# Patient Record
Sex: MALE | Race: WHITE | NOT HISPANIC OR LATINO | Employment: OTHER | URBAN - METROPOLITAN AREA
[De-identification: names, ages, dates, MRNs, and addresses within clinical notes are randomized per-mention and may not be internally consistent; named-entity substitution may affect disease eponyms.]

---

## 2017-06-22 ENCOUNTER — OFFICE VISIT (OUTPATIENT)
Dept: NEUROLOGY | Facility: CLINIC | Age: 82
End: 2017-06-22

## 2017-06-22 VITALS
BODY MASS INDEX: 19.46 KG/M2 | SYSTOLIC BLOOD PRESSURE: 130 MMHG | HEIGHT: 64 IN | WEIGHT: 114 LBS | DIASTOLIC BLOOD PRESSURE: 72 MMHG | HEART RATE: 107 BPM | OXYGEN SATURATION: 98 %

## 2017-06-22 DIAGNOSIS — G30.1 LATE ONSET ALZHEIMER'S DISEASE WITHOUT BEHAVIORAL DISTURBANCE (HCC): Primary | ICD-10-CM

## 2017-06-22 DIAGNOSIS — R41.3 MEMORY LOSS: ICD-10-CM

## 2017-06-22 DIAGNOSIS — R63.4 WEIGHT LOSS: ICD-10-CM

## 2017-06-22 DIAGNOSIS — G95.9 MYELOPATHY (HCC): ICD-10-CM

## 2017-06-22 DIAGNOSIS — F02.80 LATE ONSET ALZHEIMER'S DISEASE WITHOUT BEHAVIORAL DISTURBANCE (HCC): Primary | ICD-10-CM

## 2017-06-22 PROBLEM — J44.9 CHRONIC OBSTRUCTIVE PULMONARY DISEASE (HCC): Status: ACTIVE | Noted: 2017-06-22

## 2017-06-22 PROBLEM — N40.0 BENIGN PROSTATIC HYPERPLASIA: Status: ACTIVE | Noted: 2017-06-22

## 2017-06-22 PROBLEM — I65.29 STENOSIS OF CAROTID ARTERY: Status: ACTIVE | Noted: 2017-06-22

## 2017-06-22 PROBLEM — E78.5 HYPERLIPIDEMIA: Status: ACTIVE | Noted: 2017-06-22

## 2017-06-22 PROBLEM — F03.90 DEMENTIA (HCC): Status: ACTIVE | Noted: 2017-01-25

## 2017-06-22 PROBLEM — I25.2 OLD MYOCARDIAL INFARCTION: Status: ACTIVE | Noted: 2017-06-19

## 2017-06-22 PROBLEM — I10 BENIGN ESSENTIAL HYPERTENSION: Status: ACTIVE | Noted: 2017-06-22

## 2017-06-22 PROBLEM — I63.9 CEREBROVASCULAR ACCIDENT (HCC): Status: ACTIVE | Noted: 2017-06-22

## 2017-06-22 PROBLEM — I77.9 PERIPHERAL ARTERIAL OCCLUSIVE DISEASE (HCC): Status: ACTIVE | Noted: 2017-06-22

## 2017-06-22 PROBLEM — I71.40 ABDOMINAL AORTIC ANEURYSM (HCC): Status: ACTIVE | Noted: 2017-06-22

## 2017-06-22 PROBLEM — I51.89 DIASTOLIC DYSFUNCTION: Status: ACTIVE | Noted: 2017-06-22

## 2017-06-22 PROBLEM — Z87.898 HISTORY OF DISEASE: Status: ACTIVE | Noted: 2017-06-19

## 2017-06-22 PROBLEM — K21.9 GASTROESOPHAGEAL REFLUX DISEASE: Status: ACTIVE | Noted: 2017-06-22

## 2017-06-22 PROBLEM — I50.9 HEART FAILURE (HCC): Status: ACTIVE | Noted: 2017-06-22

## 2017-06-22 PROCEDURE — 99204 OFFICE O/P NEW MOD 45 MIN: CPT | Performed by: PSYCHIATRY & NEUROLOGY

## 2017-06-22 RX ORDER — DONEPEZIL HYDROCHLORIDE 5 MG/1
TABLET, FILM COATED ORAL
COMMUNITY
Start: 2017-05-12 | End: 2017-06-22 | Stop reason: SDUPTHER

## 2017-06-22 RX ORDER — OMEPRAZOLE 20 MG/1
20 CAPSULE, DELAYED RELEASE ORAL
COMMUNITY
Start: 2017-01-25

## 2017-06-22 RX ORDER — TOLTERODINE TARTRATE 2 MG/1
TABLET, EXTENDED RELEASE ORAL
Refills: 0 | COMMUNITY
Start: 2017-03-22 | End: 2017-08-21

## 2017-06-22 RX ORDER — HYDROCODONE BITARTRATE AND ACETAMINOPHEN 10; 325 MG/1; MG/1
TABLET ORAL
COMMUNITY
Start: 2017-04-27 | End: 2020-08-18

## 2017-06-22 RX ORDER — DONEPEZIL HYDROCHLORIDE 10 MG/1
10 TABLET, FILM COATED ORAL NIGHTLY
Qty: 30 TABLET | Refills: 5 | Status: SHIPPED | OUTPATIENT
Start: 2017-06-22 | End: 2017-08-21 | Stop reason: SDUPTHER

## 2017-06-22 RX ORDER — HYDRALAZINE HYDROCHLORIDE 50 MG/1
TABLET, FILM COATED ORAL
COMMUNITY
Start: 2017-04-12

## 2017-06-22 RX ORDER — CARVEDILOL 6.25 MG/1
TABLET ORAL
COMMUNITY
Start: 2017-05-23

## 2017-06-22 NOTE — PROGRESS NOTES
CC: Memory loss    HPI:  Heraclio Rebolledo is a  81 y.o.  right-handed white male sent by Dr. Camp for a neurologic consultation regarding memory loss.  The patient states he noticed some symptoms beginning back about 16 months ago.  Symptoms have slowly progressed.  He says in the last for 5 months he has had some additional problems with orientation at night such as where he is located in the house with respect to his room and bathroom.  A nightlight has helped him with this since he cannot see a little bit of surroundings.  He has nocturia and takes Detrol 2 tablets for about 6 months or more but states he can't sleep if he is taking 2 tablets and so he only takes 1.  It clearly affects his urinary flow he says.  He denied any changes in memory when this medicine was started.  He was placed on Aricept 5 mg.  He complains of constipation chronically.  He does take Norco occasionally but not every day.  This is for back pain and left knee pain.  He has not tried higher dose Aricept.  He denies any notable side effect to it.    He has previously had an MRI of the brain which she brought a disc from [facility].  He has small vessel changes and a tiny lacunae in in the left cerebellar hemisphere.  Hippocampus region looks atrophic on both sides and there isn't significant frontal or temporal focal atrophy.    There is no clear clinical stroke history he states.  He has no history of meningitis seizures or syncope.  He has history of 2 stents but no open-heart surgery.  He has other evidence of PAD with bilateral leg stance.  He continues to smoke but has reduced the number of cigarettes to about 6 per day.    He has some visual loss in the left eye probably related to a previous retinal detachment status post surgery as well as macular degeneration.        Past Medical History:   Diagnosis Date   • Dementia          History reviewed. No pertinent surgical history.        Current Outpatient Prescriptions:   •   aspirin 81 MG tablet, Take 81 mg by mouth., Disp: , Rfl:   •  carvedilol (COREG) 6.25 MG tablet, take 1 tablet by mouth twice a day with food, Disp: , Rfl:   •  donepezil (ARICEPT) 10 MG tablet, Take 1 tablet by mouth Every Night., Disp: 30 tablet, Rfl: 5  •  hydrALAZINE (APRESOLINE) 50 MG tablet, take 1 tablet by mouth every 8 hours, Disp: , Rfl:   •  HYDROcodone-acetaminophen (NORCO)  MG per tablet, take 1 tablet by mouth every 8 hours if needed for pain, Disp: , Rfl:   •  Multiple Vitamins-Minerals (MULTIVITAMIN ADULT PO), Take  by mouth., Disp: , Rfl:   •  omeprazole (priLOSEC) 20 MG capsule, Take 20 mg by mouth., Disp: , Rfl:   •  tolterodine (DETROL) 2 MG tablet, take 1 tablet by mouth every morning and 1 capsule AT 6PM, Disp: , Rfl: 0      History reviewed. No pertinent family history.      Social History     Social History   • Marital status:      Spouse name: N/A   • Number of children: N/A   • Years of education: N/A     Occupational History   • Not on file.     Social History Main Topics   • Smoking status: Current Every Day Smoker     Types: Cigarettes   • Smokeless tobacco: Not on file   • Alcohol use No   • Drug use: Not on file   • Sexual activity: Not on file     Other Topics Concern   • Not on file     Social History Narrative   • No narrative on file         Allergies   Allergen Reactions   • Celecoxib    • Clopidogrel Bisulfate Other (See Comments)     Interaction with flomax   • Lovastatin    • Simvastatin    • Sulfa Antibiotics Nausea Only   • Valsartan          Pain Scale:5/10, left knee        ROS:  Review of Systems   Constitutional: Positive for chills, fatigue and unexpected weight change. Negative for activity change and appetite change.   HENT: Positive for rhinorrhea, sneezing and trouble swallowing. Negative for ear pain, facial swelling and hearing loss.    Eyes: Positive for itching and visual disturbance (double vision ). Negative for pain and redness.   Respiratory:  "Positive for shortness of breath. Negative for cough and choking.    Cardiovascular: Negative for chest pain and leg swelling.   Gastrointestinal: Positive for constipation. Negative for abdominal pain, nausea and vomiting.   Endocrine: Positive for cold intolerance and polydipsia. Negative for heat intolerance.   Genitourinary: Positive for difficulty urinating.   Musculoskeletal: Positive for back pain and myalgias. Negative for arthralgias and joint swelling.   Skin: Positive for rash. Negative for color change, pallor and wound.   Allergic/Immunologic: Negative for environmental allergies and food allergies.   Neurological: Positive for light-headedness. Negative for dizziness, tremors, seizures, syncope, facial asymmetry, speech difficulty, weakness, numbness and headaches.   Hematological: Negative for adenopathy. Bruises/bleeds easily.   Psychiatric/Behavioral: Positive for behavioral problems and sleep disturbance. Negative for agitation, confusion, decreased concentration, dysphoric mood, hallucinations, self-injury and suicidal ideas. The patient is not nervous/anxious and is not hyperactive.            Physical Exam:  Vitals:    06/22/17 0802   BP: 130/72   Pulse: 107   SpO2: 98%   Weight: 114 lb (51.7 kg)   Height: 64\" (162.6 cm)     Asymmetric blood pressures of 170/70 in the right arm and 140/70 on the left  Body mass index is 19.57 kg/(m^2).    Physical Exam  Gen.: Thin white male no acute distress (progressive weight loss noted of at least 40 pounds)  HEENT: Normocephalic no evidence of trauma.  Discs flat.  Throat negative.  Neck: Supple.  No thyromegaly.  There are bilateral carotid and subclavian bruits although they are soft.  The left radial pulse is slightly delayed compared to the right.  Heart: Regular rate and rhythm without murmur      Neurological Exam:   Mental status: Awake alert oriented and conversant without evidence of an affective disorder thought disorder delusions or " hallucinations.  HCF: No aphasia.  Mild constructional dyspraxia noted.  He has a delayed recall issue on 0 items of 3 and 3 minutes.  His Mini-Mental state score was 24/30 and his clock draw was 3/4.  Knowledge of recent events is intact.  Cranial nerves: 2-12 intact except for significantly reduced visual acuity in the left eye.  Motor: Some trouble with relaxation when checking tone.  Subtle if any weakness in the left side.  Reflexes: Diffusely excessively brisk except jaw jerk is not increased.  Toe signs appeared downgoing.  Sensory: Patchy reductions but overall no obvious deficits except reduced vibration sense at the ankles.  Position sense was questionable in the left great toe.  Cerebellar: Finger to nose and rapid movements looked okay.  Heel-to-shin looks okay.  Gait and Station: Antalgic with left knee pain.  Steps are short and somewhat broad-based.        Results:      No results found for: GLUCOSE, BUN, CREATININE, EGFRIFNONA, EGFRIFAFRI, BCR, CO2, CALCIUM, PROTENTOTREF, ALBUMIN, LABIL2, AST, ALT    No results found for: WBC, HGB, HCT, MCV, PLT      .No results found for: RPR      No results found for: TSH, S7SYMQK, F6NPLBK, THYROIDAB      No results found for: [id number]      No results found for: FOLATE      No results found for: HGBA1C          Assessment:   1.  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  There is additional microvascular changes in the brain and a portion of this could be vasculopathic but it is not entirely clear.  2.  Diffusely brisk reflexes with exception of jaw jerk-I cannot include a cervical spine structural lesion to cause myelopathy.  3.  Progressive weight loss        Plan:  1.  Increase Aricept to 10 mg nightly  2.  MRI cervical spine  3.  Labs including ESR, RPR, B12 and folate and thyroid functions.  4.  Chest x-ray PA and lateral-look for an occult malignancy  5.  I told him to talk  with her primary care office further about his weight loss.  Some of which could be calorie intake but there could also be an underlying medical illness such as malignancy  6.                        Dictated utilizing Dragon dictation.

## 2017-07-07 ENCOUNTER — HOSPITAL ENCOUNTER (OUTPATIENT)
Dept: MRI IMAGING | Facility: HOSPITAL | Age: 82
Discharge: HOME | End: 2017-07-07
Attending: PSYCHIATRY & NEUROLOGY | Admitting: PSYCHIATRY & NEUROLOGY

## 2017-07-07 ENCOUNTER — HOSPITAL ENCOUNTER (OUTPATIENT)
Dept: GENERAL RADIOLOGY | Facility: HOSPITAL | Age: 82
Discharge: HOME | End: 2017-07-07
Attending: PSYCHIATRY & NEUROLOGY

## 2017-07-07 DIAGNOSIS — G95.9 MYELOPATHY (HCC): ICD-10-CM

## 2017-07-07 DIAGNOSIS — R63.4 WEIGHT LOSS: ICD-10-CM

## 2017-07-07 PROCEDURE — 71020 HC CHEST PA AND LATERAL: CPT

## 2017-07-07 PROCEDURE — 72141 MRI NECK SPINE W/O DYE: CPT

## 2017-07-13 ENCOUNTER — TELEPHONE (OUTPATIENT)
Dept: NEUROLOGY | Facility: CLINIC | Age: 82
End: 2017-07-13

## 2017-07-13 NOTE — TELEPHONE ENCOUNTER
----- Message from Darrius Avina sent at 7/13/2017 12:35 PM EDT -----  Contact: [phone]  Pt would like his MRI results.

## 2017-07-15 NOTE — TELEPHONE ENCOUNTER
Chu,  I look at the MRI of cervical spine.  He does have spondylosis but no spinal cord signal changes for convincing case of myelopathy due to compression.  He also has narrowing of nerve outlet's at the same level in particular but his exam doesn't really fit with that.  Basically you can tell him I do not feel that problems in his neck are related to his balance    He was to have lab work which I don't see has been done and he was posterior have a follow-up with me 6 weeks after I saw him which I don't see it scheduled.  Please investigate    Gns

## 2017-08-10 ENCOUNTER — TELEPHONE (OUTPATIENT)
Dept: NEUROLOGY | Facility: CLINIC | Age: 82
End: 2017-08-10

## 2017-08-10 NOTE — TELEPHONE ENCOUNTER
----- Message from Darrius Avina sent at 8/10/2017 11:56 AM EDT -----  Contact: [phone]  pts daughter called and would like to speak with Dr George about the findings on his MRI.  Please Advice.

## 2017-08-10 NOTE — TELEPHONE ENCOUNTER
He has an 8/21/17 F/U with me. This can be discussed then.    I have looked at it on the MRI C spine. 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    nimas

## 2017-08-10 NOTE — TELEPHONE ENCOUNTER
S/w pt's daughter she said MRI had stated that he may have some sort of a mass on his pituitary gland. She wanted to see if that's something that should be further investigated?

## 2017-08-15 ENCOUNTER — TELEPHONE (OUTPATIENT)
Dept: NEUROLOGY | Facility: CLINIC | Age: 82
End: 2017-08-15

## 2017-08-15 ENCOUNTER — DOCUMENTATION (OUTPATIENT)
Dept: NEUROLOGY | Facility: CLINIC | Age: 82
End: 2017-08-15

## 2017-08-15 NOTE — TELEPHONE ENCOUNTER
S/w pt wife informed her that I checked on labs he had done at pcp office. The labs are not the same as the ones Dr. Goerge ordered pt is ok to go get labs done.

## 2017-08-21 ENCOUNTER — OFFICE VISIT (OUTPATIENT)
Dept: NEUROLOGY | Facility: CLINIC | Age: 82
End: 2017-08-21

## 2017-08-21 ENCOUNTER — TRANSCRIBE ORDERS (OUTPATIENT)
Dept: NEUROLOGY | Facility: CLINIC | Age: 82
End: 2017-08-21

## 2017-08-21 ENCOUNTER — LAB (OUTPATIENT)
Dept: LAB | Facility: HOSPITAL | Age: 82
End: 2017-08-21
Attending: PSYCHIATRY & NEUROLOGY

## 2017-08-21 VITALS — DIASTOLIC BLOOD PRESSURE: 60 MMHG | HEART RATE: 74 BPM | OXYGEN SATURATION: 98 % | SYSTOLIC BLOOD PRESSURE: 142 MMHG

## 2017-08-21 DIAGNOSIS — G30.1 ALZHEIMER'S TYPE DEMENTIA WITH LATE ONSET WITHOUT BEHAVIORAL DISTURBANCE (HCC): ICD-10-CM

## 2017-08-21 DIAGNOSIS — G95.9 MYELOPATHY (HCC): ICD-10-CM

## 2017-08-21 DIAGNOSIS — F02.80 ALZHEIMER'S TYPE DEMENTIA WITH LATE ONSET WITHOUT BEHAVIORAL DISTURBANCE (HCC): ICD-10-CM

## 2017-08-21 DIAGNOSIS — F02.80 LATE ONSET ALZHEIMER'S DISEASE WITHOUT BEHAVIORAL DISTURBANCE (HCC): ICD-10-CM

## 2017-08-21 DIAGNOSIS — G95.9 MYELOPATHY (HCC): Primary | ICD-10-CM

## 2017-08-21 DIAGNOSIS — G30.1 LATE ONSET ALZHEIMER'S DISEASE WITHOUT BEHAVIORAL DISTURBANCE (HCC): ICD-10-CM

## 2017-08-21 PROBLEM — E23.7 PITUITARY ABNORMALITY (HCC): Status: ACTIVE | Noted: 2017-08-10

## 2017-08-21 PROBLEM — M50.90 CERVICAL DISC DISEASE: Status: ACTIVE | Noted: 2017-08-09

## 2017-08-21 PROBLEM — M51.9 LUMBAR DISC DISEASE: Status: ACTIVE | Noted: 2017-08-09

## 2017-08-21 LAB
ERYTHROCYTE [SEDIMENTATION RATE] IN BLOOD: 36 MM/HR (ref 0–20)
FOLATE SERPL-MCNC: >20 NG/ML (ref 4.78–24.2)
T4 FREE SERPL-MCNC: 1.08 NG/DL (ref 0.93–1.7)
TSH SERPL DL<=0.05 MIU/L-ACNC: 3.93 MIU/ML (ref 0.27–4.2)
VIT B12 BLD-MCNC: 813 PG/ML (ref 211–946)

## 2017-08-21 PROCEDURE — 86592 SYPHILIS TEST NON-TREP QUAL: CPT | Performed by: PSYCHIATRY & NEUROLOGY

## 2017-08-21 PROCEDURE — 99214 OFFICE O/P EST MOD 30 MIN: CPT | Performed by: PSYCHIATRY & NEUROLOGY

## 2017-08-21 PROCEDURE — 82607 VITAMIN B-12: CPT | Performed by: PSYCHIATRY & NEUROLOGY

## 2017-08-21 PROCEDURE — 36415 COLL VENOUS BLD VENIPUNCTURE: CPT | Performed by: PSYCHIATRY & NEUROLOGY

## 2017-08-21 PROCEDURE — 82746 ASSAY OF FOLIC ACID SERUM: CPT | Performed by: PSYCHIATRY & NEUROLOGY

## 2017-08-21 PROCEDURE — 82525 ASSAY OF COPPER: CPT

## 2017-08-21 PROCEDURE — 84443 ASSAY THYROID STIM HORMONE: CPT | Performed by: PSYCHIATRY & NEUROLOGY

## 2017-08-21 PROCEDURE — 85652 RBC SED RATE AUTOMATED: CPT | Performed by: PSYCHIATRY & NEUROLOGY

## 2017-08-21 PROCEDURE — 84439 ASSAY OF FREE THYROXINE: CPT | Performed by: PSYCHIATRY & NEUROLOGY

## 2017-08-21 RX ORDER — NITROGLYCERIN 0.4 MG/1
0.4 TABLET SUBLINGUAL NIGHTLY
Refills: 1 | COMMUNITY
Start: 2017-08-15

## 2017-08-21 RX ORDER — DONEPEZIL HYDROCHLORIDE 10 MG/1
10 TABLET, FILM COATED ORAL
COMMUNITY
Start: 2017-08-09 | End: 2017-08-21 | Stop reason: SDUPTHER

## 2017-08-21 RX ORDER — DONEPEZIL HYDROCHLORIDE 10 MG/1
10 TABLET, FILM COATED ORAL NIGHTLY
Qty: 30 TABLET | Refills: 5 | Status: SHIPPED | OUTPATIENT
Start: 2017-08-21 | End: 2018-02-22 | Stop reason: SDUPTHER

## 2017-08-21 RX ORDER — OXYBUTYNIN CHLORIDE 5 MG/1
5 TABLET ORAL 2 TIMES DAILY
Refills: 0 | COMMUNITY
Start: 2017-08-11 | End: 2019-12-04

## 2017-08-21 NOTE — PROGRESS NOTES
CC: Alzheimer's disease, balance disturbance, pituitary abnormality    HPI:  Heraclio Rebolledo is a  82 y.o.  right-handed male who I saw previously most recently 6/22/17 with Alzheimer's disease.  He was on Aricept 5 mg daily which we increased to 10 mg daily.  He states that he feels that his memory is a bit better.  His appetite is actually picked up some and he denies any GI side effects such as nausea or diarrhea.  He has some problems with constipation.  He is pleased with the increased dosage of Aricept    He has balance trouble which is largely related to his right knee.  He gets injections periodically on the right knee.  When I examined him before he had diffusely brisk reflexes but not the jaw jerk.  I obtained an MRI of the cervical spine which showed multilevel degenerative disc disease with bulging and spurring at C4/5 knee facing the cord.  There is no T2 abnormality in the cord however.  There is bilateral foraminal stenosis at that level that is moderate to severe.  I am not impressed that there is additional severe foraminal stenosis at any level.  He does indicate some weakness in his arms in particular lifting with the right arm and pushing with the left arm.        Past Medical History:   Diagnosis Date   • Dementia          History reviewed. No pertinent surgical history.        Current Outpatient Prescriptions:   •  aspirin 81 MG tablet, Take 81 mg by mouth., Disp: , Rfl:   •  carvedilol (COREG) 6.25 MG tablet, take 1 tablet by mouth twice a day with food, Disp: , Rfl:   •  donepezil (ARICEPT) 10 MG tablet, Take 1 tablet by mouth Every Night., Disp: 30 tablet, Rfl: 5  •  hydrALAZINE (APRESOLINE) 50 MG tablet, take 1 tablet by mouth every 8 hours, Disp: , Rfl:   •  Multiple Vitamins-Minerals (MULTIVITAMIN ADULT PO), Take  by mouth., Disp: , Rfl:   •  omeprazole (priLOSEC) 20 MG capsule, Take 20 mg by mouth., Disp: , Rfl:   •  HYDROcodone-acetaminophen (NORCO)  MG per tablet, take 1 tablet by  mouth every 8 hours if needed for pain, Disp: , Rfl:   •  nitroglycerin (NITROSTAT) 0.4 MG SL tablet, , Disp: , Rfl: 1  •  oxybutynin (DITROPAN) 5 MG tablet, , Disp: , Rfl: 0      No family history on file.      Social History     Social History   • Marital status:      Spouse name: N/A   • Number of children: N/A   • Years of education: N/A     Occupational History   • Not on file.     Social History Main Topics   • Smoking status: Current Every Day Smoker     Types: Cigarettes   • Smokeless tobacco: Not on file   • Alcohol use No   • Drug use: Not on file   • Sexual activity: Not on file     Other Topics Concern   • Not on file     Social History Narrative         Allergies   Allergen Reactions   • Celecoxib    • Clopidogrel Bisulfate Other (See Comments)     Interaction with flomax   • Lovastatin    • Simvastatin    • Sulfa Antibiotics Nausea Only   • Valsartan          Pain Scale:3/10        ROS:  Review of Systems   Constitutional: Negative for appetite change.   HENT: Negative for ear pain, facial swelling and hearing loss.    Eyes: Negative for pain, redness and itching.   Respiratory: Negative for cough, choking and shortness of breath.    Gastrointestinal: Negative for abdominal pain, nausea and vomiting.   Endocrine: Negative for cold intolerance and heat intolerance.   Musculoskeletal: Positive for gait problem. Negative for arthralgias, back pain and myalgias.   Skin: Negative for color change, pallor, rash and wound.   Allergic/Immunologic: Negative for environmental allergies and food allergies.   Neurological: Negative for dizziness, tremors, syncope, facial asymmetry, speech difficulty, weakness, light-headedness, numbness and headaches.   Hematological: Negative for adenopathy. Does not bruise/bleed easily.   Psychiatric/Behavioral: Negative for agitation, behavioral problems, confusion, decreased concentration, dysphoric mood, hallucinations, self-injury, sleep disturbance and suicidal  ideas. The patient is nervous/anxious and is hyperactive.            Physical Exam:  Vitals:    08/21/17 1623   BP: 142/60   Pulse: 74   SpO2: 98%     There is no height or weight on file to calculate BMI.    Physical Exam  Gen.: Thin white male no acute distress  HEENT: Normocephalic no evidence of trauma.  Neck: Supple.  Heart: Regular rate and rhythm      Neurological Exam:   Mental status: Awake alert oriented conversant provides a good history without evidence of an affective disorder thought disorder delusions or hallucinations.  HCF: No aphasia or dysarthria.  Had some constructional dyspraxia.  Some delayed recall deficits.  Knowledge of recent events intact.  Cranial nerves: 2-12 intact  Motor: No spasticity.  There is diffuse reduction of muscle bulk.  There is mild weakness of the right deltoid and biceps and left triceps finger and thumb extensors and pronator teres.  Reflexes are diffusely brisk perhaps less with the left triceps.  Jaw jerk is not increased.  Cerebellar: Finger to nose rapid movements are not dysmetric.  Gait and station is antalgic and mildly broad-based.  Complains of pain in the right knee with weight loading        Results:      No results found for: GLUCOSE, BUN, CREATININE, EGFRIFNONA, EGFRIFAFRI, BCR, CO2, CALCIUM, PROTENTOTREF, ALBUMIN, LABIL2, AST, ALT    No results found for: WBC, HGB, HCT, MCV, PLT      .No results found for: RPR      Lab Results   Component Value Date    TSH 3.930 08/21/2017         Lab Results   Component Value Date    [id number] 813 08/21/2017         Lab Results   Component Value Date    FOLATE >20.00 08/21/2017         No results found for: HGBA1C    MRI cervical spine films reviewed as noted above.  The posterior lobe of the pituitary appeared larger than expected but the pituitary did not extend outside of the sella turcica.  MRI brain report reviewed from [facility] showing some fat signal in the sella and so pituitary tumor appears unlikely.  They  mention no change compared to study previously 2012        Assessment:   1.  Alzheimer's disease-patient feels better on higher dose Aricept  2.  Gait disturbance in part due to chronic right knee pain.  Evaluation of the cervical spine discloses spinal cord effacement at C4/5 without severe stenosis and no T2 signal changes in the cord.  There is however foraminal stenosis on both sides at that level which can go along with a right C5 radiculopathy but I did not clearly see a finding that would explain C7 problems on the left.          Plan:  1.  Continue Aricept 10 mg daily  2.  Discussed the cervical spine issue at length with the patient and his daughter who was with him today.  At age 82 most neurosurgeons are not very aggressive at operating for fear of increased surgical complications.  He seems to tolerate radiculopathy symptoms okay.  I am not convinced that cervical myelopathy is present.  We discussed options to reevaluate in 6 months or if he seems clearly worse from a balance point of view he could be reevaluated prior to that but surgery on his neck there is a significantly increased risk given his age and medical history.  3.  Follow-up 6 months to see me                          Dictated utilizing Dragon dictation.

## 2017-08-22 LAB — RPR SER QL: NORMAL

## 2017-08-24 LAB — COPPER SERPL-MCNC: 121 UG/DL (ref 72–166)

## 2018-02-22 ENCOUNTER — OFFICE VISIT (OUTPATIENT)
Dept: NEUROLOGY | Facility: CLINIC | Age: 83
End: 2018-02-22

## 2018-02-22 VITALS
HEART RATE: 71 BPM | OXYGEN SATURATION: 98 % | WEIGHT: 118 LBS | SYSTOLIC BLOOD PRESSURE: 122 MMHG | DIASTOLIC BLOOD PRESSURE: 64 MMHG | HEIGHT: 64 IN | BODY MASS INDEX: 20.14 KG/M2

## 2018-02-22 DIAGNOSIS — G30.1 LATE ONSET ALZHEIMER'S DISEASE WITHOUT BEHAVIORAL DISTURBANCE (HCC): ICD-10-CM

## 2018-02-22 DIAGNOSIS — G45.8 OTHER TRANSIENT CEREBRAL ISCHEMIC ATTACKS AND RELATED SYNDROMES: ICD-10-CM

## 2018-02-22 DIAGNOSIS — R42 DIZZINESS AND GIDDINESS: ICD-10-CM

## 2018-02-22 DIAGNOSIS — F02.80 LATE ONSET ALZHEIMER'S DISEASE WITHOUT BEHAVIORAL DISTURBANCE (HCC): ICD-10-CM

## 2018-02-22 DIAGNOSIS — R09.89 SUSPECTED CEREBROVASCULAR ACCIDENT (CVA): Primary | ICD-10-CM

## 2018-02-22 PROBLEM — R68.89 FLU-LIKE SYMPTOMS: Status: ACTIVE | Noted: 2017-12-19

## 2018-02-22 PROBLEM — R25.2 NOCTURNAL MUSCLE CRAMPS: Status: ACTIVE | Noted: 2017-10-13

## 2018-02-22 PROBLEM — J20.9 ACUTE BRONCHITIS: Status: ACTIVE | Noted: 2017-12-19

## 2018-02-22 PROBLEM — J32.0 MAXILLARY SINUSITIS: Status: ACTIVE | Noted: 2017-12-19

## 2018-02-22 PROBLEM — D64.9 ANEMIA: Status: ACTIVE | Noted: 2017-11-06

## 2018-02-22 PROCEDURE — 99214 OFFICE O/P EST MOD 30 MIN: CPT | Performed by: PSYCHIATRY & NEUROLOGY

## 2018-02-22 RX ORDER — DONEPEZIL HYDROCHLORIDE 10 MG/1
10 TABLET, FILM COATED ORAL NIGHTLY
Qty: 30 TABLET | Refills: 5 | Status: SHIPPED | OUTPATIENT
Start: 2018-02-22 | End: 2018-06-22 | Stop reason: SDUPTHER

## 2018-02-22 NOTE — PROGRESS NOTES
CC: Parkinson's disease and restless leg syndrome    HPI:  Heraclio Rebolledo is a  82 y.o.  right-handed white male who I saw last 8/21/17 for Parkinson's disease and restless leg syndrome.  In addition he has early dementia.  He states that he feels that his memory is a bit better.  His appetite is actually picked up some and he denies any GI side effects such as nausea or diarrhea.  He has some problems with constipation.  He is pleased with the increased dosage of Aricept.  He would like to Consider escalating the dosage further.     He has balance trouble which is largely related to his right knee.  He gets injections periodically on the right knee.  When I examined him before he had diffusely brisk reflexes but not the jaw jerk.  I obtained an MRI of the cervical spine which showed multilevel degenerative disc disease with bulging and spurring at C4/5 knee facing the cord.  There is no T2 abnormality in the cord however.  There is bilateral foraminal stenosis at that level that is moderate to severe.  I am not impressed that there is additional severe foraminal stenosis at any level.  He does indicate some weakness in his arms in particular lifting with the right arm and pushing with the left arm.        Past Medical History:   Diagnosis Date   • Dementia          History reviewed. No pertinent surgical history.        Current Outpatient Prescriptions:   •  aspirin 81 MG tablet, Take 81 mg by mouth., Disp: , Rfl:   •  carvedilol (COREG) 6.25 MG tablet, take 1 tablet by mouth twice a day with food, Disp: , Rfl:   •  donepezil (ARICEPT) 10 MG tablet, Take 1 tablet by mouth Every Night., Disp: 30 tablet, Rfl: 5  •  hydrALAZINE (APRESOLINE) 50 MG tablet, take 1 tablet by mouth every 8 hours, Disp: , Rfl:   •  Multiple Vitamins-Minerals (MULTIVITAMIN ADULT PO), Take  by mouth., Disp: , Rfl:   •  omeprazole (priLOSEC) 20 MG capsule, Take 20 mg by mouth., Disp: , Rfl:   •  oxybutynin (DITROPAN) 5 MG tablet, , Disp: , Rfl:  0  •  HYDROcodone-acetaminophen (NORCO)  MG per tablet, take 1 tablet by mouth every 8 hours if needed for pain, Disp: , Rfl:   •  nitroglycerin (NITROSTAT) 0.4 MG SL tablet, , Disp: , Rfl: 1      No family history on file.      Social History     Social History   • Marital status:      Spouse name: N/A   • Number of children: N/A   • Years of education: N/A     Occupational History   • Not on file.     Social History Main Topics   • Smoking status: Current Every Day Smoker     Types: Cigarettes   • Smokeless tobacco: Not on file   • Alcohol use No   • Drug use: Not on file   • Sexual activity: Not on file     Other Topics Concern   • Not on file     Social History Narrative         Allergies   Allergen Reactions   • Celecoxib    • Clopidogrel Bisulfate Other (See Comments)     Interaction with flomax   • Lovastatin    • Simvastatin    • Sulfa Antibiotics Nausea Only   • Valsartan          Pain Scale:0/10        ROS:  Review of Systems   Constitutional: Negative for activity change, appetite change and fatigue.   HENT: Negative for ear pain, facial swelling and hearing loss.    Eyes: Positive for visual disturbance (left eye-blurry). Negative for pain, redness and itching.   Respiratory: Negative for cough, choking and shortness of breath.    Cardiovascular: Negative for chest pain and leg swelling.   Gastrointestinal: Negative for abdominal pain, nausea and vomiting.   Endocrine: Negative for cold intolerance and heat intolerance.   Skin: Negative for color change, pallor, rash and wound.   Allergic/Immunologic: Negative for environmental allergies and food allergies.   Neurological: Positive for dizziness, light-headedness and headaches. Negative for tremors, seizures, syncope, speech difficulty, weakness and numbness.   Hematological: Negative for adenopathy. Does not bruise/bleed easily.   Psychiatric/Behavioral: Positive for agitation, confusion and sleep disturbance. Negative for behavioral  "problems, dysphoric mood, hallucinations, self-injury and suicidal ideas. The patient is not nervous/anxious and is not hyperactive.            Physical Exam:  Vitals:    02/22/18 1117   BP: 122/64   Pulse: 71   SpO2: 98%   Weight: 53.5 kg (118 lb)   Height: 162.6 cm (64\")     Body mass index is 20.25 kg/(m^2).    Physical Exam  Gen.: Thin white male no acute distress  HEENT: Normocephalic no evidence of trauma.  Neck: Supple.  Heart: Regular rate and rhythm      Neurological Exam:   Mental status: Awake alert oriented and conversant.  No evidence of an affective disorder thought disorder delusions or hallucinations.  HCF: No aphasia or apraxia or dysarthria.  Mild memory problems are evident  Cranial nerves: Cranial nerves II-12 intact  Motor: Normal tone.  There is weakness of both biceps and triceps muscles.  Reflexes: Diffusely brisk  Cerebellar: Finger to nose rapid movements intact  Gait and Station: Pain inhibition due to knee pain with improvement after being on his feet for a minute or 2.        Results:      No results found for: GLUCOSE, BUN, CREATININE, EGFRIFNONA, EGFRIFAFRI, BCR, CO2, CALCIUM, PROTENTOTREF, ALBUMIN, LABIL2, AST, ALT    No results found for: WBC, HGB, HCT, MCV, PLT      .  Lab Results   Component Value Date    RPR Non-Reactive 08/21/2017         Lab Results   Component Value Date    TSH 3.930 08/21/2017         Lab Results   Component Value Date    [id number] 813 08/21/2017         Lab Results   Component Value Date    FOLATE >20.00 08/21/2017         No results found for: HGBA1C            Assessment:   1.  Alzheimer's disease doing well on Aricept 10 mg nightly.  2.  Probable bilateral C6 radiculopathies-previous MRI reviewed showing significant foraminal stenosis at that level but he is a poor candidate for surgery  3.  Leg cramps primarily Possibly associated with caffeine consumption or perhaps Aricept        Plan:  1.  Continue Aricept 10 mg nightly.  He has no GI symptoms but " does have some nightmares and cramping which can be side effects of Aricept.  He also drinks about a pot of coffee per day which may contribute to the cramping.  2.  Poor candidate for neurosurgery.  3.  Recommended significant cut back on caffeine for his restless legs.  Also recommended remain hydrated and be sure to include potassium containing foods and calcium containing foods in diet.  4.  Follow-up 6 months with Nick Aleman                      Dictated utilizing Dragon dictation.

## 2018-06-22 DIAGNOSIS — G30.1 LATE ONSET ALZHEIMER'S DISEASE WITHOUT BEHAVIORAL DISTURBANCE (HCC): ICD-10-CM

## 2018-06-22 DIAGNOSIS — F02.80 LATE ONSET ALZHEIMER'S DISEASE WITHOUT BEHAVIORAL DISTURBANCE (HCC): ICD-10-CM

## 2018-06-22 RX ORDER — DONEPEZIL HYDROCHLORIDE 10 MG/1
TABLET, FILM COATED ORAL
Qty: 90 TABLET | Refills: 1 | Status: SHIPPED | OUTPATIENT
Start: 2018-06-22 | End: 2018-09-07 | Stop reason: SDUPTHER

## 2018-09-07 ENCOUNTER — OFFICE VISIT (OUTPATIENT)
Dept: NEUROLOGY | Facility: CLINIC | Age: 83
End: 2018-09-07

## 2018-09-07 VITALS
BODY MASS INDEX: 19.63 KG/M2 | HEART RATE: 67 BPM | SYSTOLIC BLOOD PRESSURE: 122 MMHG | OXYGEN SATURATION: 97 % | DIASTOLIC BLOOD PRESSURE: 58 MMHG | HEIGHT: 64 IN | WEIGHT: 115 LBS

## 2018-09-07 DIAGNOSIS — G25.81 RESTLESS LEGS SYNDROME (RLS): Primary | ICD-10-CM

## 2018-09-07 DIAGNOSIS — F02.818 LATE ONSET ALZHEIMER'S DISEASE WITH BEHAVIORAL DISTURBANCE (HCC): ICD-10-CM

## 2018-09-07 DIAGNOSIS — G30.1 LATE ONSET ALZHEIMER'S DISEASE WITH BEHAVIORAL DISTURBANCE (HCC): ICD-10-CM

## 2018-09-07 PROBLEM — R45.4 DIFFICULTY CONTROLLING ANGER: Status: ACTIVE | Noted: 2018-06-22

## 2018-09-07 PROBLEM — F03.918 DEMENTIA WITH BEHAVIORAL DISTURBANCE (HCC): Status: ACTIVE | Noted: 2017-01-25

## 2018-09-07 PROBLEM — R06.00 DYSPNEA: Status: ACTIVE | Noted: 2018-08-02

## 2018-09-07 PROBLEM — R07.9 CHEST PAIN: Status: ACTIVE | Noted: 2018-08-02

## 2018-09-07 PROBLEM — R42 DIZZINESS: Status: ACTIVE | Noted: 2018-06-14

## 2018-09-07 PROCEDURE — 99214 OFFICE O/P EST MOD 30 MIN: CPT | Performed by: PSYCHIATRY & NEUROLOGY

## 2018-09-07 RX ORDER — ISOSORBIDE MONONITRATE 30 MG/1
30 TABLET, EXTENDED RELEASE ORAL
COMMUNITY
Start: 2018-08-02

## 2018-09-07 RX ORDER — MECLIZINE HCL 12.5 MG/1
12.5 TABLET ORAL
COMMUNITY
Start: 2018-06-14 | End: 2019-12-04

## 2018-09-07 RX ORDER — DONEPEZIL HYDROCHLORIDE 23 MG/1
23 TABLET, FILM COATED ORAL
Qty: 30 TABLET | Refills: 6 | Status: SHIPPED | OUTPATIENT
Start: 2018-09-07 | End: 2018-11-26 | Stop reason: SDUPTHER

## 2018-09-07 RX ORDER — MEMANTINE HYDROCHLORIDE 5 MG/1
TABLET ORAL
Refills: 0 | COMMUNITY
Start: 2018-06-25 | End: 2018-09-07 | Stop reason: SINTOL

## 2018-09-07 NOTE — PROGRESS NOTES
CC: Alzheimer's disease, Parkinson's disease, restless leg syndrome    HPI:  Heraclio Rebolledo is a  83 y.o.  right-handed white male who I am seeing in follow-up for his Alzheimer's disease, Parkinson's disease and restless leg syndrome.  I saw him last 2/22/18.  His daughter accompanied him to help with the history.  The patient states that he has 45 bad days for every good day he has.  When describing a good or bad day he talks about his memory and his temper.  He has a short temper and sometimes gets aggressive according to his daughter.  He never displays this flaw in the office however.  He does have some sleep irregularities.  He typically goes to bed at 6 or 7 PM and it takes 2 or 3 hours to go to sleep some of which is related to restless legs.  He sleeps usually until 5 or 6 in the morning.  He drinks only 2 cups of coffee daily wheras he drank a lot of coffee in the past.  It didn't seem to affect his restless legs or sleep pattern at all he states when he cut back on the dosage.  He does get tired in the daytime and takes a nap daily usually 15-20 minutes but his daughter says it's longer than that oftentimes one or 2 hours.  He has had some appetite issues.  He has lost some weight.  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  When asked, he does state yes he feels like he is depressed.  He continues to have some troubles with intermittent constipation.  He is on Aricept 10 mg daily.        Past Medical History:   Diagnosis Date   • Dementia          No past surgical history on file.        Current Outpatient Prescriptions:   •  aspirin 81 MG tablet, Take 81 mg by mouth., Disp: , Rfl:   •  carvedilol (COREG) 6.25 MG tablet, take 1 tablet by mouth twice a day with food, Disp: , Rfl:   •  donepezil (ARICEPT) 23 MG tablet, Take 1 tablet by mouth every night at bedtime., Disp: 30 tablet, Rfl:  6  •  hydrALAZINE (APRESOLINE) 50 MG tablet, take 1 tablet by mouth every 8 hours, Disp: , Rfl:   •  HYDROcodone-acetaminophen (NORCO)  MG per tablet, take 1 tablet by mouth every 8 hours if needed for pain, Disp: , Rfl:   •  meclizine (ANTIVERT) 12.5 MG tablet, Take 12.5 mg by mouth., Disp: , Rfl:   •  Multiple Vitamins-Minerals (MULTIVITAMIN ADULT PO), Take  by mouth., Disp: , Rfl:   •  nitroglycerin (NITROSTAT) 0.4 MG SL tablet, Take 0.4 mg by mouth Every Night., Disp: , Rfl: 1  •  omeprazole (priLOSEC) 20 MG capsule, Take 20 mg by mouth., Disp: , Rfl:   •  oxybutynin (DITROPAN) 5 MG tablet, Take 5 mg by mouth 2 (Two) Times a Day., Disp: , Rfl: 0  •  isosorbide mononitrate (IMDUR) 30 MG 24 hr tablet, Take 30 mg by mouth., Disp: , Rfl:       No family history on file.      Social History     Social History   • Marital status:      Spouse name: N/A   • Number of children: N/A   • Years of education: N/A     Occupational History   • Not on file.     Social History Main Topics   • Smoking status: Current Every Day Smoker     Types: Cigarettes   • Smokeless tobacco: Never Used   • Alcohol use No   • Drug use: Unknown   • Sexual activity: Not on file     Other Topics Concern   • Not on file     Social History Narrative   • No narrative on file         Allergies   Allergen Reactions   • Celecoxib    • Clopidogrel Bisulfate Other (See Comments)     Interaction with flomax   • Lovastatin    • Simvastatin    • Sulfa Antibiotics Nausea Only   • Valsartan          Pain Scale: 6/10, knee pain        ROS:  Review of Systems   Constitutional: Positive for fatigue. Negative for chills and fever.   HENT: Negative for hearing loss, tinnitus and trouble swallowing.    Eyes: Negative for pain, redness and itching.   Respiratory: Negative for cough, shortness of breath and wheezing.    Cardiovascular: Positive for chest pain and palpitations. Negative for leg swelling.   Gastrointestinal: Negative for constipation,  "diarrhea, nausea and vomiting.   Endocrine: Negative for cold intolerance, heat intolerance and polydipsia.   Genitourinary: Negative for decreased urine volume, dysuria, frequency and urgency.   Musculoskeletal: Positive for back pain. Negative for gait problem, neck pain and neck stiffness.   Skin: Negative for color change, rash and wound.   Allergic/Immunologic: Negative for environmental allergies, food allergies and immunocompromised state.   Neurological: Positive for weakness. Negative for dizziness, tremors, seizures, syncope, facial asymmetry, speech difficulty, light-headedness, numbness and headaches.   Hematological: Negative for adenopathy. Bruises/bleeds easily.   Psychiatric/Behavioral: Positive for confusion and sleep disturbance. Negative for agitation, behavioral problems, decreased concentration, dysphoric mood, hallucinations, self-injury and suicidal ideas. The patient is nervous/anxious (sometimes). The patient is not hyperactive.            Physical Exam:  Vitals:    09/07/18 0835   BP: 122/58   Pulse: 67   SpO2: 97%   Weight: 52.2 kg (115 lb)   Height: 162.6 cm (64\")     Body mass index is 19.74 kg/m².    Physical Exam  Gen.: Underweight white male no acute distress  HEENT: Normocephalic no evidence of trauma.  Neck: Supple.  Heart: Regular rate and rhythm.      Neurological Exam:   Mental status: Awake alert and conversant.  He tends to be a bit verbose.  His affect does not appear depressed.  He does not lose his temper.  HCF: No aphasia and no obvious dyspraxia.  Short-term memory is mildly affected.  Cranial nerves: 2-12 intact  Motor: No focal weakness with completely normal tone.  Cerebellar: Finger to nose shows a mild endpoint tremor.  I do not see any resting tremor or any tremor while on his feet.  Gait and Station: Antalgic due to knee pain        Results:      No results found for: GLUCOSE, BUN, CREATININE, EGFRIFNONA, EGFRIFAFRI, BCR, CO2, CALCIUM, PROTENTOTREF, ALBUMIN, LABIL2, " AST, ALT    No results found for: WBC, HGB, HCT, MCV, PLT      .  Lab Results   Component Value Date    RPR Non-Reactive 08/21/2017         Lab Results   Component Value Date    TSH 3.930 08/21/2017         Lab Results   Component Value Date    [id number] 813 08/21/2017         Lab Results   Component Value Date    FOLATE >20.00 08/21/2017         No results found for: HGBA1C            Assessment:   1.  Probable Alzheimer's disease.  He is currently on the usual target dosage of Aricept at 10 mg nightly.  Higher dose has been tried and in the appropriate population may be of further benefit without substantial.  He was taken off of Namenda because of some agitation issues but the agitation problems have persisted  2.  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  Sleep disturbance-unlikely to be a caffeine issue since he has reduced his caffeine to about 2 cups of coffee daily  per his daughter        Plan:  1.  Escalate Aricept to the 23 mg dosage given at night.  2.  I asked that they contact the primary care physician regarding the suspected depression to determine if he wanted to treat him specifically or defer to psychiatry.  3.  Follow-up in about 2 months with nurse practitioner to follow-up on the escalated dosage of Aricept as well as to further address issue with restless legs.  Consider trial of Requip given shortly before bedtime            At least 15 minutes of this 25 minute follow-up were spent counseling the patient and daughter regarding his Alzheimer's disease and likely depression with treatment options              Dictated utilizing Dragon dictation.

## 2018-11-08 ENCOUNTER — OFFICE VISIT (OUTPATIENT)
Dept: NEUROLOGY | Facility: CLINIC | Age: 83
End: 2018-11-08

## 2018-11-08 VITALS
OXYGEN SATURATION: 98 % | WEIGHT: 112 LBS | SYSTOLIC BLOOD PRESSURE: 130 MMHG | BODY MASS INDEX: 19.12 KG/M2 | HEIGHT: 64 IN | DIASTOLIC BLOOD PRESSURE: 58 MMHG | HEART RATE: 57 BPM

## 2018-11-08 DIAGNOSIS — F02.818 LATE ONSET ALZHEIMER'S DISEASE WITH BEHAVIORAL DISTURBANCE (HCC): ICD-10-CM

## 2018-11-08 DIAGNOSIS — G45.8 OTHER TRANSIENT CEREBRAL ISCHEMIC ATTACKS AND RELATED SYNDROMES: ICD-10-CM

## 2018-11-08 DIAGNOSIS — R42 DIZZINESS AND GIDDINESS: ICD-10-CM

## 2018-11-08 DIAGNOSIS — R09.89 SUSPECTED CEREBROVASCULAR ACCIDENT (CVA): ICD-10-CM

## 2018-11-08 DIAGNOSIS — G30.1 LATE ONSET ALZHEIMER'S DISEASE WITH BEHAVIORAL DISTURBANCE (HCC): ICD-10-CM

## 2018-11-08 PROCEDURE — 99214 OFFICE O/P EST MOD 30 MIN: CPT | Performed by: NURSE PRACTITIONER

## 2018-11-08 RX ORDER — DIVALPROEX SODIUM 250 MG/1
125 TABLET, DELAYED RELEASE ORAL NIGHTLY
COMMUNITY
End: 2020-08-18 | Stop reason: SDUPTHER

## 2018-11-08 NOTE — PROGRESS NOTES
"CC: Alzheimer's disease, Parkinson's Disease and restless leg syndrome     HPI:  Heraclio Rebolledo is a pleasant 83 y.o. right-handed male who I am seeing today in follow-up. He is accompanied by his daughter who assists w/ providing history.     Mr. Rebolledo was last seen by Dr. Manas George on 09/07/2018 for Alzheimer's disease, Parkinson's Disease and restless leg syndrome. At the last visit Aricept was escalated to 23 mg qhs. Daughter and patient report much improved mood; decreased agitation. Patient reports \"I have more good days than bad now\". Overall memory better. Daughter says that he is able to stay more involved in the conversation. She has noticed that he can relocate items that he put away and remembers things from the past that he didn't used to be lila to recall. He is much more aware of his medications and is even able to identify when a pill is missing. I repeated his MME which is unchanged 24/30 still w/ poor clock draw.     He reports double vision; saw opthalmology 3 months ago and got new glasses. He and daughter report that the ophthalmology says that the cause of his double vision in non-operable. Patient denies HA, blurred vision, dizziness, numbness or loss of sensation or any other new neurological complaints at this time. Since the last visit he reports (1)  Non-injury fall (hit head) which he feels is secondary to hit \"bad\" left knee.     In regards to his RLS; he denies any complaints or concerns at this time. He states that he does not have \"bean horses\" anymore. He would like to hold off on RLS w/u at this time.             Past Medical History:   Diagnosis Date   • Dementia          History reviewed. No pertinent surgical history.        Current Outpatient Medications:   •  aspirin 81 MG tablet, Take 81 mg by mouth., Disp: , Rfl:   •  carvedilol (COREG) 6.25 MG tablet, take 1 tablet by mouth twice a day with food, Disp: , Rfl:   •  divalproex (DEPAKOTE) 250 MG DR tablet, Take 125 mg by " mouth Every Night. Pt takes a half tablet at night 125mg, Disp: , Rfl:   •  donepezil (ARICEPT) 23 MG tablet, Take 1 tablet by mouth every night at bedtime., Disp: 30 tablet, Rfl: 3  •  hydrALAZINE (APRESOLINE) 50 MG tablet, take 1 tablet by mouth every 8 hours, Disp: , Rfl:   •  HYDROcodone-acetaminophen (NORCO)  MG per tablet, take 1 tablet by mouth every 8 hours if needed for pain, Disp: , Rfl:   •  isosorbide mononitrate (IMDUR) 30 MG 24 hr tablet, Take 30 mg by mouth., Disp: , Rfl:   •  meclizine (ANTIVERT) 12.5 MG tablet, Take 12.5 mg by mouth., Disp: , Rfl:   •  Multiple Vitamins-Minerals (MULTIVITAMIN ADULT PO), Take  by mouth., Disp: , Rfl:   •  nitroglycerin (NITROSTAT) 0.4 MG SL tablet, Take 0.4 mg by mouth Every Night., Disp: , Rfl: 1  •  omeprazole (priLOSEC) 20 MG capsule, Take 20 mg by mouth., Disp: , Rfl:   •  oxybutynin (DITROPAN) 5 MG tablet, Take 5 mg by mouth 2 (Two) Times a Day., Disp: , Rfl: 0      History reviewed. No pertinent family history.      Social History     Socioeconomic History   • Marital status:      Spouse name: Not on file   • Number of children: Not on file   • Years of education: Not on file   • Highest education level: Not on file   Social Needs   • Financial resource strain: Not on file   • Food insecurity - worry: Not on file   • Food insecurity - inability: Not on file   • Transportation needs - medical: Not on file   • Transportation needs - non-medical: Not on file   Occupational History   • Not on file   Tobacco Use   • Smoking status: Current Every Day Smoker     Packs/day: 0.50     Types: Cigarettes   • Smokeless tobacco: Never Used   Substance and Sexual Activity   • Alcohol use: No   • Drug use: Not on file   • Sexual activity: Not on file   Other Topics Concern   • Not on file   Social History Narrative   • Not on file         Allergies   Allergen Reactions   • Celecoxib    • Clopidogrel Bisulfate Other (See Comments)     Interaction with flomax   •  "Lovastatin    • Simvastatin    • Sulfa Antibiotics Nausea Only   • Valsartan          Pain Scale:0/10        ROS:  Review of Systems   Constitutional: Positive for fatigue. Negative for activity change, appetite change, chills, diaphoresis, fever and unexpected weight change.   HENT: Positive for trouble swallowing. Negative for drooling, hearing loss, tinnitus and voice change.    Eyes: Positive for visual disturbance (double vision - has seen Wilber and Liudmila already). Negative for photophobia and pain.   Respiratory: Negative for chest tightness and shortness of breath.    Cardiovascular: Negative for chest pain, palpitations and leg swelling.   Gastrointestinal: Negative for blood in stool, nausea and vomiting.   Endocrine: Negative for cold intolerance and heat intolerance.   Genitourinary: Negative for difficulty urinating.   Musculoskeletal: Positive for gait problem. Negative for neck pain and neck stiffness.   Skin: Negative for rash.   Neurological: Positive for dizziness, speech difficulty, light-headedness and numbness (left foot). Negative for tremors, seizures, syncope, facial asymmetry, weakness and headaches.   Hematological: Bruises/bleeds easily.   Psychiatric/Behavioral: Positive for sleep disturbance. Negative for agitation, behavioral problems, confusion, hallucinations and suicidal ideas. The patient is nervous/anxious.            Physical Exam:  Vitals:    11/08/18 1110   BP: 130/58   BP Location: Left arm   Patient Position: Sitting   Cuff Size: Adult   Pulse: 57   SpO2: 98%   Weight: 50.8 kg (112 lb)   Height: 162.6 cm (64.02\")     Body mass index is 19.22 kg/m².    Physical Exam    Physical Exam  Gen.: Underweight white male no acute distress  HEENT: Normocephalic no evidence of trauma.  Neck: Supple.  Heart: Regular rate and rhythm.        Neurological Exam:   Mental status: Awake alert, attentive/interative in conversation. Jovial @ times.  HCF: No aphasia and no obvious dyspraxia.  " Short-term memory is mildly affected.  Cranial nerves: 2-12 intact  Motor: No focal weakness with completely normal tone.  Cerebellar: Normal (finger to nose and heel to shin)  Gait and Station: Antalgic due to knee pain      Results:      No results found for: GLUCOSE, BUN, CREATININE, EGFRIFNONA, EGFRIFAFRI, BCR, CO2, CALCIUM, PROTENTOTREF, ALBUMIN, LABIL2, AST, ALT    No results found for: WBC, HGB, HCT, MCV, PLT      .  Lab Results   Component Value Date    RPR Non-Reactive 08/21/2017         Lab Results   Component Value Date    TSH 3.930 08/21/2017         Lab Results   Component Value Date    [id number] 813 08/21/2017         Lab Results   Component Value Date    FOLATE >20.00 08/21/2017         No results found for: HGBA1C            Assessment:   1. Probable Alzheimer's disease         Plan:  · Continue Aricept 23mg qhs   · Follow-up w/ me in 6-8 weeks; sooner if needed                             Dictated utilizing Dragon dictation.

## 2018-11-26 RX ORDER — DONEPEZIL HYDROCHLORIDE 23 MG/1
23 TABLET, FILM COATED ORAL
Qty: 30 TABLET | Refills: 3 | Status: SHIPPED | OUTPATIENT
Start: 2018-11-26 | End: 2019-02-26 | Stop reason: SDUPTHER

## 2019-02-26 ENCOUNTER — OFFICE VISIT (OUTPATIENT)
Dept: NEUROLOGY | Facility: CLINIC | Age: 84
End: 2019-02-26

## 2019-02-26 VITALS
BODY MASS INDEX: 18.95 KG/M2 | OXYGEN SATURATION: 99 % | WEIGHT: 111 LBS | SYSTOLIC BLOOD PRESSURE: 130 MMHG | DIASTOLIC BLOOD PRESSURE: 75 MMHG | HEIGHT: 64 IN | HEART RATE: 65 BPM

## 2019-02-26 DIAGNOSIS — G30.1 LATE ONSET ALZHEIMER'S DISEASE WITH BEHAVIORAL DISTURBANCE (HCC): ICD-10-CM

## 2019-02-26 DIAGNOSIS — F02.818 LATE ONSET ALZHEIMER'S DISEASE WITH BEHAVIORAL DISTURBANCE (HCC): ICD-10-CM

## 2019-02-26 PROCEDURE — 99213 OFFICE O/P EST LOW 20 MIN: CPT | Performed by: NURSE PRACTITIONER

## 2019-02-26 RX ORDER — AMOXICILLIN 250 MG/5ML
500 POWDER, FOR SUSPENSION ORAL EVERY 8 HOURS
COMMUNITY
Start: 2019-02-21 | End: 2019-03-03

## 2019-02-26 RX ORDER — DONEPEZIL HYDROCHLORIDE 23 MG/1
23 TABLET, FILM COATED ORAL
Qty: 30 TABLET | Refills: 3 | Status: SHIPPED | OUTPATIENT
Start: 2019-02-26 | End: 2019-08-30 | Stop reason: SDUPTHER

## 2019-02-26 NOTE — PROGRESS NOTES
CC: Alzheimer's disease, Parkinson's disease and RLS    HPI:  Heraclio Rebolledo is a  83 y.o.  right-handed male Alzheimer's disease, Parkinson's disease and restless leg syndrome.  He is accompanied by his daughter who assist with providing history.    Mr. Rebolledo was last seen in follow-up by me on 11/8/2018 I repeated his Mini-Mental at that time and it was unchanged from previous which was 24 out of 30.  He had been started on Aricept 23 mg nightly.  He remains on the Aricept.  He continues to report more good days than bad.  He reports increased frustration with the amount of time it often takes him to complete task that he previously did without any issues.  His daughter reports that overall his memory is better as well is his mood and agitation.  He reported that he thinks about SI and review of systems.  He denies that on my exam he states that he gets very frustrated and feels like a burden at times.  His daughter agrees he does not believe he is a harm to himself or anyone else.  I repeated the Mini-Mental today and he has improved he is 27 out of 30 with the majority of his deficits being related to picture copying and counting backwards and spelling the word world backwards.  Clock draw; large [place] drawn appropriately, numbers are in the appropriate location on the clock face; he is unable to face clock hands at the correct time I would say this is mildly abnormal.     He reports adequate sleep and a fair appetite.  He had a fall 1 week ago.  He was taking out the trash and missed a step.  He fell down 4 steps and total.  He reported abrasion and laceration to the bridge of his nose which appears healed at this time.  He did not go to the hospital or primary care physician to be seen for this.  He reports that he often feels anxious and shaky and again sometimes frustrated as noted above.  He is otherwise doing well and would like to continue on the same medication he has seen great benefit since  initiation.         Past Medical History:   Diagnosis Date   • Anemia    • Dementia          History reviewed. No pertinent surgical history.        Current Outpatient Medications:   •  amoxicillin (AMOXIL) 250 MG/5ML suspension, Take 500 mg by mouth Every 8 (Eight) Hours., Disp: , Rfl:   •  aspirin 81 MG tablet, Take 81 mg by mouth., Disp: , Rfl:   •  carvedilol (COREG) 6.25 MG tablet, take 1 tablet by mouth twice a day with food, Disp: , Rfl:   •  divalproex (DEPAKOTE) 250 MG DR tablet, Take 125 mg by mouth Every Night. Pt takes a half tablet at night 125mg, Disp: , Rfl:   •  donepezil (ARICEPT) 23 MG tablet, Take 1 tablet by mouth every night at bedtime., Disp: 30 tablet, Rfl: 3  •  hydrALAZINE (APRESOLINE) 50 MG tablet, take 1 tablet by mouth every 8 hours, Disp: , Rfl:   •  HYDROcodone-acetaminophen (NORCO)  MG per tablet, take 1 tablet by mouth every 8 hours if needed for pain, Disp: , Rfl:   •  isosorbide mononitrate (IMDUR) 30 MG 24 hr tablet, Take 30 mg by mouth., Disp: , Rfl:   •  meclizine (ANTIVERT) 12.5 MG tablet, Take 12.5 mg by mouth., Disp: , Rfl:   •  Multiple Vitamins-Minerals (MULTIVITAMIN ADULT PO), Take  by mouth., Disp: , Rfl:   •  nitroglycerin (NITROSTAT) 0.4 MG SL tablet, Take 0.4 mg by mouth Every Night., Disp: , Rfl: 1  •  omeprazole (priLOSEC) 20 MG capsule, Take 20 mg by mouth., Disp: , Rfl:   •  oxybutynin (DITROPAN) 5 MG tablet, Take 5 mg by mouth 2 (Two) Times a Day., Disp: , Rfl: 0      Family History   Problem Relation Age of Onset   • Aneurysm Sister    • Heart attack Brother    • Cancer Brother          Social History     Socioeconomic History   • Marital status:      Spouse name: Not on file   • Number of children: Not on file   • Years of education: Not on file   • Highest education level: Not on file   Social Needs   • Financial resource strain: Not on file   • Food insecurity - worry: Not on file   • Food insecurity - inability: Not on file   • Transportation  needs - medical: Not on file   • Transportation needs - non-medical: Not on file   Occupational History   • Not on file   Tobacco Use   • Smoking status: Current Every Day Smoker     Packs/day: 0.50     Types: Cigarettes   • Smokeless tobacco: Never Used   Substance and Sexual Activity   • Alcohol use: No   • Drug use: No   • Sexual activity: Not on file   Other Topics Concern   • Not on file   Social History Narrative   • Not on file         Allergies   Allergen Reactions   • Celecoxib    • Clopidogrel Bisulfate Other (See Comments)     Interaction with flomax   • Lovastatin    • Simvastatin    • Sulfa Antibiotics Nausea Only   • Valsartan          Pain Scale:0/10      ROS:  Review of Systems   Constitutional: Positive for activity change, appetite change and unexpected weight change.   HENT: Positive for postnasal drip and trouble swallowing. Negative for facial swelling and sinus pain.    Eyes: Positive for photophobia (when coming in from outside). Negative for pain and redness.   Respiratory: Negative for chest tightness, shortness of breath and wheezing.    Cardiovascular: Negative for chest pain, palpitations and leg swelling.   Gastrointestinal: Positive for diarrhea (occasional). Negative for nausea and vomiting.   Endocrine: Negative for polydipsia, polyphagia and polyuria.   Musculoskeletal: Positive for arthralgias (left knee; cortisone shots) and gait problem. Negative for back pain, joint swelling, myalgias, neck pain and neck stiffness.   Neurological: Positive for speech difficulty (trouble finding words) and numbness (both little fingers). Negative for dizziness, tremors, seizures, syncope, facial asymmetry, weakness, light-headedness and headaches.   Hematological: Negative for adenopathy. Bruises/bleeds easily.   Psychiatric/Behavioral: Positive for agitation, confusion, decreased concentration, self-injury and suicidal ideas. Negative for behavioral problems, dysphoric mood, hallucinations and  "sleep disturbance. The patient is nervous/anxious. The patient is not hyperactive.            Physical Exam:  Vitals:    02/26/19 1357   BP: 130/75   BP Location: Left arm   Patient Position: Sitting   Cuff Size: Adult   Pulse: 65   SpO2: 99%   Weight: 50.3 kg (111 lb)   Height: 162.6 cm (64.02\")     Body mass index is 19.04 kg/m².    Physical Exam    Gen.: Underweight white male no acute distress  HEENT: Normocephalic no evidence of trauma. Healing wound to bridge of nose.   Neck: Supple.  Heart: Regular rate and rhythm.        Neurological Exam:   Mental status: Awake alert, attentive/interative in conversation. Jovial @ times and teary at others  HCF: No aphasia and no obvious dyspraxia.  MME improved 3 points since last eval  Cranial nerves: 2-12 intact  Motor: No focal weakness with completely normal tone.  Cerebellar: Normal (finger to nose and heel to shin)  Gait and Station: Antalgic due to knee pain      Results:      No results found for: GLUCOSE, BUN, CREATININE, EGFRIFNONA, EGFRIFAFRI, BCR, CO2, CALCIUM, PROTENTOTREF, ALBUMIN, LABIL2, AST, ALT    No results found for: WBC, HGB, HCT, MCV, PLT      .  Lab Results   Component Value Date    RPR Non-Reactive 08/21/2017         Lab Results   Component Value Date    TSH 3.930 08/21/2017         Lab Results   Component Value Date    [id number] 813 08/21/2017         Lab Results   Component Value Date    FOLATE >20.00 08/21/2017         No results found for: HGBA1C            Assessment:   1.  Probable Alzheimer's disease            Plan:  · Continue Aricept 23 mg at bedtime  · Follow-up with me in 3 months; sooner if indicated                            Dictated utilizing Dragon dictation.   "

## 2019-05-11 DIAGNOSIS — F02.818 LATE ONSET ALZHEIMER'S DISEASE WITH BEHAVIORAL DISTURBANCE (HCC): ICD-10-CM

## 2019-05-11 DIAGNOSIS — G30.1 LATE ONSET ALZHEIMER'S DISEASE WITH BEHAVIORAL DISTURBANCE (HCC): ICD-10-CM

## 2019-05-15 RX ORDER — DONEPEZIL HYDROCHLORIDE 23 MG/1
TABLET, FILM COATED ORAL
Qty: 30 TABLET | Refills: 3 | Status: SHIPPED | OUTPATIENT
Start: 2019-05-15 | End: 2019-09-03 | Stop reason: SDUPTHER

## 2019-08-26 ENCOUNTER — OFFICE VISIT (OUTPATIENT)
Dept: NEUROLOGY | Facility: CLINIC | Age: 84
End: 2019-08-26

## 2019-08-26 VITALS
WEIGHT: 109 LBS | HEIGHT: 64 IN | HEART RATE: 67 BPM | SYSTOLIC BLOOD PRESSURE: 170 MMHG | DIASTOLIC BLOOD PRESSURE: 62 MMHG | BODY MASS INDEX: 18.61 KG/M2 | OXYGEN SATURATION: 88 %

## 2019-08-26 DIAGNOSIS — G30.1 LATE ONSET ALZHEIMER'S DISEASE WITH BEHAVIORAL DISTURBANCE (HCC): Primary | ICD-10-CM

## 2019-08-26 DIAGNOSIS — F02.818 LATE ONSET ALZHEIMER'S DISEASE WITH BEHAVIORAL DISTURBANCE (HCC): Primary | ICD-10-CM

## 2019-08-26 PROCEDURE — 99213 OFFICE O/P EST LOW 20 MIN: CPT | Performed by: NURSE PRACTITIONER

## 2019-08-26 RX ORDER — AZELASTINE 1 MG/ML
SPRAY, METERED NASAL
Refills: 5 | COMMUNITY
Start: 2019-05-28

## 2019-08-26 RX ORDER — DIVALPROEX SODIUM 250 MG/1
250 TABLET, EXTENDED RELEASE ORAL DAILY
COMMUNITY
Start: 2019-08-11 | End: 2020-08-18 | Stop reason: SDUPTHER

## 2019-08-26 RX ORDER — IPRATROPIUM BROMIDE 42 UG/1
SPRAY, METERED NASAL
Refills: 2 | COMMUNITY
Start: 2019-07-13 | End: 2020-08-18

## 2019-08-26 RX ORDER — TAMSULOSIN HYDROCHLORIDE 0.4 MG/1
CAPSULE ORAL
Refills: 3 | COMMUNITY
Start: 2019-06-22

## 2019-08-30 DIAGNOSIS — G30.1 LATE ONSET ALZHEIMER'S DISEASE WITH BEHAVIORAL DISTURBANCE (HCC): ICD-10-CM

## 2019-08-30 DIAGNOSIS — F02.818 LATE ONSET ALZHEIMER'S DISEASE WITH BEHAVIORAL DISTURBANCE (HCC): ICD-10-CM

## 2019-09-03 RX ORDER — DONEPEZIL HYDROCHLORIDE 23 MG/1
23 TABLET, FILM COATED ORAL
Qty: 30 TABLET | Refills: 3 | Status: SHIPPED | OUTPATIENT
Start: 2019-09-03 | End: 2019-12-04 | Stop reason: SDUPTHER

## 2019-09-07 ENCOUNTER — OFFICE VISIT (OUTPATIENT)
Dept: RETAIL CLINIC | Facility: CLINIC | Age: 84
End: 2019-09-07

## 2019-09-07 VITALS
HEART RATE: 60 BPM | RESPIRATION RATE: 20 BRPM | TEMPERATURE: 97.4 F | SYSTOLIC BLOOD PRESSURE: 138 MMHG | DIASTOLIC BLOOD PRESSURE: 54 MMHG | OXYGEN SATURATION: 99 %

## 2019-09-07 DIAGNOSIS — J30.9 ALLERGIC RHINITIS, UNSPECIFIED SEASONALITY, UNSPECIFIED TRIGGER: Primary | ICD-10-CM

## 2019-09-07 PROCEDURE — 99203 OFFICE O/P NEW LOW 30 MIN: CPT | Performed by: NURSE PRACTITIONER

## 2019-09-07 RX ORDER — CETIRIZINE HYDROCHLORIDE 10 MG/1
5 TABLET ORAL DAILY
Qty: 15 TABLET | Refills: 0 | Status: SHIPPED | OUTPATIENT
Start: 2019-09-07 | End: 2019-10-07

## 2019-09-07 RX ORDER — MOMETASONE FUROATE 50 UG/1
2 SPRAY, METERED NASAL DAILY
Qty: 1 EACH | Refills: 12 | Status: SHIPPED | OUTPATIENT
Start: 2019-09-07 | End: 2019-09-14

## 2019-09-07 NOTE — PATIENT INSTRUCTIONS
Allergic Rhinitis, Adult  Allergic rhinitis is an allergic reaction that affects the mucous membrane inside the nose. It causes sneezing, a runny or stuffy nose, and the feeling of mucus going down the back of the throat (postnasal drip). Allergic rhinitis can be mild to severe.  There are two types of allergic rhinitis:  · Seasonal. This type is also called hay fever. It happens only during certain seasons.  · Perennial. This type can happen at any time of the year.  What are the causes?  This condition happens when the body's defense system (immune system) responds to certain harmless substances called allergens as though they were germs.   Seasonal allergic rhinitis is triggered by pollen, which can come from grasses, trees, and weeds. Perennial allergic rhinitis may be caused by:  · House dust mites.  · Pet dander.  · Mold spores.  What are the signs or symptoms?  Symptoms of this condition include:  · Sneezing.  · Runny or stuffy nose (nasal congestion).  · Postnasal drip.  · Itchy nose.  · Tearing of the eyes.  · Trouble sleeping.  · Daytime sleepiness.  How is this diagnosed?  This condition may be diagnosed based on:  · Your medical history.  · A physical exam.  · Tests to check for related conditions, such as:  ? Asthma.  ? Pink eye.  ? Ear infection.  ? Upper respiratory infection.  · Tests to find out which allergens trigger your symptoms. These may include skin or blood tests.  How is this treated?  There is no cure for this condition, but treatment can help control symptoms. Treatment may include:  · Taking medicines that block allergy symptoms, such as antihistamines. Medicine may be given as a shot, nasal spray, or pill.  · Avoiding the allergen.  · Desensitization. This treatment involves getting ongoing shots until your body becomes less sensitive to the allergen. This treatment may be done if other treatments do not help.  · If taking medicine and avoiding the allergen does not work, new, stronger  medicines may be prescribed.  Follow these instructions at home:  · Find out what you are allergic to. Common allergens include smoke, dust, and pollen.  · Avoid the things you are allergic to. These are some things you can do to help avoid allergens:  ? Replace carpet with wood, tile, or vinyl melony. Carpet can trap dander and dust.  ? Do not smoke. Do not allow smoking in your home.  ? Change your heating and air conditioning filter at least once a month.  ? During allergy season:  § Keep windows closed as much as possible.  § Plan outdoor activities when pollen counts are lowest. This is usually during the evening hours.  § When coming indoors, change clothing and shower before sitting on furniture or bedding.  · Take over-the-counter and prescription medicines only as told by your health care provider.  · Keep all follow-up visits as told by your health care provider. This is important.  Contact a health care provider if:  · You have a fever.  · You develop a persistent cough.  · You make whistling sounds when you breathe (you wheeze).  · Your symptoms interfere with your normal daily activities.  Get help right away if:  · You have shortness of breath.  Summary  · This condition can be managed by taking medicines as directed and avoiding allergens.  · Contact your health care provider if you develop a persistent cough or fever.  · During allergy season, keep windows closed as much as possible.  This information is not intended to replace advice given to you by your health care provider. Make sure you discuss any questions you have with your health care provider.  Document Released: 09/12/2002 Document Revised: 01/25/2018 Document Reviewed: 01/25/2018  [employer] Interactive Patient Education © 2019 [employer]

## 2019-09-07 NOTE — PROGRESS NOTES
Subjective   Heraclio Rebolledo is a 84 y.o. male.     Ear Fullness    There is pain in both ears. This is a chronic problem. The current episode started 1 to 4 weeks ago (2 weeks). The problem has been waxing and waning. There has been no fever. The patient is experiencing no pain. Associated symptoms include rhinorrhea. Pertinent negatives include no coughing (minimal/intermittent), ear discharge, headaches, hearing loss or sore throat. Treatments tried: benadryl. The treatment provided mild relief. There is no history of a chronic ear infection, hearing loss or a tympanostomy tube.        The following portions of the patient's history were reviewed and updated as appropriate: allergies, current medications, past family history, past medical history, past social history, past surgical history and problem list.    Review of Systems   Constitutional: Negative for fatigue and fever.   HENT: Positive for congestion (intermittent), ear pain (fullness), postnasal drip, rhinorrhea and sneezing. Negative for ear discharge, hearing loss, sinus pressure and sore throat.    Respiratory: Negative.  Negative for cough (minimal/intermittent).    Cardiovascular: Negative.    Gastrointestinal: Negative.    Allergic/Immunologic: Positive for environmental allergies.       Objective   Physical Exam   Constitutional: He is oriented to person, place, and time. He appears well-developed.   HENT:   Head: Normocephalic.   Right Ear: Ear canal normal. A middle ear effusion is present.   Left Ear: Ear canal normal. A middle ear effusion is present.   Nose: Rhinorrhea present. Right sinus exhibits no maxillary sinus tenderness and no frontal sinus tenderness. Left sinus exhibits no maxillary sinus tenderness and no frontal sinus tenderness.   Mouth/Throat: Uvula is midline, oropharynx is clear and moist and mucous membranes are normal. No tonsillar exudate.       Cardiovascular: Normal rate, regular rhythm and normal heart sounds.    Pulmonary/Chest: Effort normal. He has decreased breath sounds in the right upper field, the right middle field, the right lower field, the left upper field, the left middle field and the left lower field.   Lymphadenopathy:     He has no cervical adenopathy.   Neurological: He is alert and oriented to person, place, and time.   Skin: Skin is warm and dry.     Vitals:    09/07/19 0848   BP: 138/54   Pulse: 60   Resp: 20   Temp: 97.4 °F (36.3 °C)   TempSrc: Oral   SpO2: 99%         Assessment/Plan   Heraclio was seen today for allergies.    Diagnoses and all orders for this visit:    Allergic rhinitis, unspecified seasonality, unspecified trigger  -     cetirizine (zyrTEC) 10 MG tablet; Take 0.5 tablets by mouth Daily for 30 days.  -     mometasone (NASONEX) 50 MCG/ACT nasal spray; 2 sprays into the nostril(s) as directed by provider Daily for 7 days.    Follow up with PCP if symptoms worsen or persist.    Allergic Rhinitis, Adult  Allergic rhinitis is an allergic reaction that affects the mucous membrane inside the nose. It causes sneezing, a runny or stuffy nose, and the feeling of mucus going down the back of the throat (postnasal drip). Allergic rhinitis can be mild to severe.  There are two types of allergic rhinitis:  · Seasonal. This type is also called hay fever. It happens only during certain seasons.  · Perennial. This type can happen at any time of the year.  What are the causes?  This condition happens when the body's defense system (immune system) responds to certain harmless substances called allergens as though they were germs.   Seasonal allergic rhinitis is triggered by pollen, which can come from grasses, trees, and weeds. Perennial allergic rhinitis may be caused by:  · House dust mites.  · Pet dander.  · Mold spores.  What are the signs or symptoms?  Symptoms of this condition include:  · Sneezing.  · Runny or stuffy nose (nasal congestion).  · Postnasal drip.  · Itchy nose.  · Tearing of the  eyes.  · Trouble sleeping.  · Daytime sleepiness.  How is this diagnosed?  This condition may be diagnosed based on:  · Your medical history.  · A physical exam.  · Tests to check for related conditions, such as:  ? Asthma.  ? Pink eye.  ? Ear infection.  ? Upper respiratory infection.  · Tests to find out which allergens trigger your symptoms. These may include skin or blood tests.  How is this treated?  There is no cure for this condition, but treatment can help control symptoms. Treatment may include:  · Taking medicines that block allergy symptoms, such as antihistamines. Medicine may be given as a shot, nasal spray, or pill.  · Avoiding the allergen.  · Desensitization. This treatment involves getting ongoing shots until your body becomes less sensitive to the allergen. This treatment may be done if other treatments do not help.  · If taking medicine and avoiding the allergen does not work, new, stronger medicines may be prescribed.  Follow these instructions at home:  · Find out what you are allergic to. Common allergens include smoke, dust, and pollen.  · Avoid the things you are allergic to. These are some things you can do to help avoid allergens:  ? Replace carpet with wood, tile, or vinyl melony. Carpet can trap dander and dust.  ? Do not smoke. Do not allow smoking in your home.  ? Change your heating and air conditioning filter at least once a month.  ? During allergy season:  § Keep windows closed as much as possible.  § Plan outdoor activities when pollen counts are lowest. This is usually during the evening hours.  § When coming indoors, change clothing and shower before sitting on furniture or bedding.  · Take over-the-counter and prescription medicines only as told by your health care provider.  · Keep all follow-up visits as told by your health care provider. This is important.  Contact a health care provider if:  · You have a fever.  · You develop a persistent cough.  · You make whistling  sounds when you breathe (you wheeze).  · Your symptoms interfere with your normal daily activities.  Get help right away if:  · You have shortness of breath.  Summary  · This condition can be managed by taking medicines as directed and avoiding allergens.  · Contact your health care provider if you develop a persistent cough or fever.  · During allergy season, keep windows closed as much as possible.  This information is not intended to replace advice given to you by your health care provider. Make sure you discuss any questions you have with your health care provider.  Document Released: 09/12/2002 Document Revised: 01/25/2018 Document Reviewed: 01/25/2018  [employer] Interactive Patient Education © 2019 [employer]

## 2019-12-04 ENCOUNTER — OFFICE VISIT (OUTPATIENT)
Dept: NEUROLOGY | Facility: CLINIC | Age: 84
End: 2019-12-04

## 2019-12-04 VITALS
WEIGHT: 109 LBS | HEIGHT: 64 IN | BODY MASS INDEX: 18.61 KG/M2 | DIASTOLIC BLOOD PRESSURE: 52 MMHG | OXYGEN SATURATION: 99 % | SYSTOLIC BLOOD PRESSURE: 108 MMHG | HEART RATE: 49 BPM

## 2019-12-04 DIAGNOSIS — G30.1 LATE ONSET ALZHEIMER'S DISEASE WITH BEHAVIORAL DISTURBANCE (HCC): Primary | ICD-10-CM

## 2019-12-04 DIAGNOSIS — F02.818 LATE ONSET ALZHEIMER'S DISEASE WITH BEHAVIORAL DISTURBANCE (HCC): Primary | ICD-10-CM

## 2019-12-04 PROCEDURE — 99213 OFFICE O/P EST LOW 20 MIN: CPT | Performed by: NURSE PRACTITIONER

## 2019-12-04 RX ORDER — DONEPEZIL HYDROCHLORIDE 23 MG/1
23 TABLET, FILM COATED ORAL
Qty: 90 TABLET | Refills: 3 | Status: SHIPPED | OUTPATIENT
Start: 2019-12-04 | End: 2020-08-18 | Stop reason: SDUPTHER

## 2019-12-04 NOTE — PROGRESS NOTES
DOS: 2019  NAME: Heraclio Rebolledo   : 1935  PCP: Joey Camp MD    Chief Complaint   Patient presents with   • Alzheimer's Disease     She is accompanied by his daughter who assist with providing medical history.    Neurological Problem and Interval History:  84 y.o. right-handed male I am seen today in follow-up for Alzheimer's disease and Parkinson's-like features.    Patient was last seen by me on 2019 for the same.  At that time his MMSE is 27/30.  Clock draw mildly abnormal 3/4.  Animal naming 13/1-minute.  He was on Aricept 23 mg nightly which had assisted with improving MMSE.  Today, MMSE 28/30.  Clock draw 1/4 and animal naming 10/1 min.  Daughter feels like there is some mild worsening in short-term memory.  Patient has good long-term memory.  He is able to complete all of his ADLs.  Takes extended amount of time to get dressed in the morning (55 minutes).  His daughter completes of finances.  He has caregiver come in 2 days/week to assist with laundry.  He lives alone with his wife.  He continues to smoke.  He reports no falls since last evaluation.  He uses no assistive devices.      Daughter called prior to the appointment due to patient recently sneaking out and driving.  Patient's response time is very slow and he has limited left eye vision therefore I would advise against driving.  I counseled patient on my recommendation guarding driving and he verbalizes understanding and agrees to comply. He denies any other complaints and/or concerns on my exam.        Review of Systems:        Review of Systems   Constitutional: Positive for fatigue. Negative for activity change and appetite change.   Musculoskeletal: Positive for gait problem.   Skin: Negative for color change and pallor.   Neurological: Positive for weakness. Negative for dizziness, tremors, seizures, syncope, facial asymmetry, speech difficulty, light-headedness, numbness and headaches.   Psychiatric/Behavioral:  "Positive for agitation, behavioral problems, confusion and decreased concentration. Negative for dysphoric mood, hallucinations, self-injury, sleep disturbance and suicidal ideas. The patient is nervous/anxious. The patient is not hyperactive.          Current Outpatient Medications:   •  aspirin 81 MG tablet, Take 81 mg by mouth., Disp: , Rfl:   •  carvedilol (COREG) 6.25 MG tablet, take 1 tablet by mouth twice a day with food, Disp: , Rfl:   •  divalproex (DEPAKOTE) 250 MG 24 hr tablet, 0.5 tablets Daily., Disp: , Rfl:   •  divalproex (DEPAKOTE) 250 MG DR tablet, Take 125 mg by mouth Every Night. Pt takes a half tablet at night 125mg, Disp: , Rfl:   •  donepezil (ARICEPT) 23 MG tablet, Take 1 tablet by mouth every night at bedtime., Disp: 30 tablet, Rfl: 3  •  hydrALAZINE (APRESOLINE) 50 MG tablet, take 1 tablet by mouth every 8 hours, Disp: , Rfl:   •  isosorbide mononitrate (IMDUR) 30 MG 24 hr tablet, Take 30 mg by mouth., Disp: , Rfl:   •  Multiple Vitamins-Minerals (MULTIVITAMIN ADULT PO), Take  by mouth., Disp: , Rfl:   •  nitroglycerin (NITROSTAT) 0.4 MG SL tablet, Take 0.4 mg by mouth Every Night., Disp: , Rfl: 1  •  omeprazole (priLOSEC) 20 MG capsule, Take 20 mg by mouth., Disp: , Rfl:   •  tamsulosin (FLOMAX) 0.4 MG capsule 24 hr capsule, TAKE 1 CAPSULE BY MOUTH NIGHTLY INSTEAD OF OXYBUTNIN, Disp: , Rfl: 3  •  azelastine (ASTELIN) 0.1 % nasal spray, USE 1 SPRAY(S) IN EACH NOSTRIL TWICE DAILY AS DIRECTED, Disp: , Rfl: 5  •  HYDROcodone-acetaminophen (NORCO)  MG per tablet, take 1 tablet by mouth every 8 hours if needed for pain, Disp: , Rfl:   •  ipratropium (ATROVENT) 0.06 % nasal spray, USE 2 SPRAY(S) IN EACH NOSTRIL TWICE DAILY, Disp: , Rfl: 2    \"The following portions of the patient's history were reviewed and updated as appropriate: allergies, current medications, past family history, past medical history, past social history, past surgical history and problem list.\"  Review and Interpretation " of Imaging:  No new imaging reviewed.  Laboratory Results:             No results found for: HGBA1C    No results found for: CHOL      Lab Results   Component Value Date    HDL 59 07/20/2018         Lab Results   Component Value Date     (H) 07/20/2018         Lab Results   Component Value Date    TRIG 122 07/20/2018     Lab Results   Component Value Date    RPR Non-Reactive 08/21/2017     Lab Results   Component Value Date    TSH 1.580 09/13/2019     Lab Results   Component Value Date    [id number] 853 09/13/2019       Physical examination  Gen.: Underweight white male no acute distress  HEENT: Normocephalic no evidence of trauma.   Neck: Supple.  Heart: Regular rate and rhythm.;  Sinus bradycardia on arrival disease 40s)        Neurological Exam:   Mental status: Awake alert, attentive/interative in conversation. Jovial @ times and teary at others (unchanged from prior exams)   HCF: No aphasia and no obvious dyspraxia.  MMSE 28/30.  Clock drawing 1/4.  Animal naming 10 animals in 1 minute  Cranial nerves: 2-12 intact except left eye visual field cut  Motor: No focal weakness with completely normal tone.  No cogwheeling/rigidity noted.   Cerebellar: Normal (finger to nose and heel to shin)  Gait and Station: Antalgic due to knee pain         Assessment:  1.  Probable Alzheimer's disease; MMSE improved with initiation and titration of Aricept    Plan:   Continue Aricept 23 mg nightly   Continue Depakote as written   Blood pressure control to <130/80   Goal LDL <70-recommend high dose statins-    Serum glucose < 140   Call 911 for stroke any stroke symptoms   Follow-up with me in 3 months; sooner if needed    Heraclio was seen today for alzheimer's disease.    Diagnoses and all orders for this visit:    Late onset Alzheimer's disease with behavioral disturbance (CMS/HCC)

## 2020-03-05 ENCOUNTER — OFFICE VISIT (OUTPATIENT)
Dept: NEUROLOGY | Facility: CLINIC | Age: 85
End: 2020-03-05

## 2020-03-05 VITALS
HEART RATE: 66 BPM | HEIGHT: 64 IN | SYSTOLIC BLOOD PRESSURE: 150 MMHG | WEIGHT: 111 LBS | DIASTOLIC BLOOD PRESSURE: 60 MMHG | OXYGEN SATURATION: 98 % | BODY MASS INDEX: 18.95 KG/M2

## 2020-03-05 DIAGNOSIS — F02.818 LATE ONSET ALZHEIMER'S DISEASE WITH BEHAVIORAL DISTURBANCE (HCC): Primary | ICD-10-CM

## 2020-03-05 DIAGNOSIS — R41.82 ALTERED MENTAL STATUS, UNSPECIFIED ALTERED MENTAL STATUS TYPE: ICD-10-CM

## 2020-03-05 DIAGNOSIS — G30.1 LATE ONSET ALZHEIMER'S DISEASE WITH BEHAVIORAL DISTURBANCE (HCC): Primary | ICD-10-CM

## 2020-03-05 DIAGNOSIS — I77.9 CAROTID ARTERY DISEASE, UNSPECIFIED LATERALITY, UNSPECIFIED TYPE (HCC): ICD-10-CM

## 2020-03-05 DIAGNOSIS — Z91.81 AT HIGH RISK FOR FALLS: ICD-10-CM

## 2020-03-05 DIAGNOSIS — Z87.440 HISTORY OF UTI: ICD-10-CM

## 2020-03-05 DIAGNOSIS — J44.9 CHRONIC OBSTRUCTIVE PULMONARY DISEASE, UNSPECIFIED COPD TYPE (HCC): ICD-10-CM

## 2020-03-05 PROCEDURE — 99213 OFFICE O/P EST LOW 20 MIN: CPT | Performed by: NURSE PRACTITIONER

## 2020-03-05 RX ORDER — QUETIAPINE FUMARATE 25 MG/1
12.5 TABLET, FILM COATED ORAL DAILY
COMMUNITY
Start: 2020-01-30

## 2020-03-05 NOTE — PROGRESS NOTES
CC:Alzheimer's Disease and Concern for PD     HPI:  Heraclio Rebolledo is a  84 y.o.  right-handed male who I am seeing today in follow-up for Alzheimer's Disease and Parkinson's-like features.      Mr. Rebolledo was last seen in follow-up by me 12/4/2019 for the same. At that time, MMSE 28/30 Clock draw 1/4 and animal naming 10 in 1 min. Today, daughter wife reports she feels his memory and mood has worsened.  Patient previously on Depakote 125 mg daily; recently increased to 250 mg daily.  Of note patient had recent urinary tract infection 12/31 and was hospitalized for several days and received IV antibiotics.  Patient and his wife recently  due to inability to live together as wife has bipolar and according to the daughter is very difficult at times to live with as is patient which he agrees with.  He fell several weeks back; noninjury fall.  Daughter states that she went and helped him up off the floor.  Patient states that he lay on the floor for several hours before someone came to help him up.  He recalls that neighbors helped him although daughter states that she was the one that came to his aid.  Today, MMSE 23/30, clock draw 2/4 and animal naming 13 animals in 1 minute.  Patient very adamant to drive.  I do not think patient should drive based on mental state although I told patient if he wanted to complete driving assessment to get a second opinion I would be open to referring him to get this done.  He denies any other complaints and/or concerns at this time.         Past Medical History:   Diagnosis Date   • Anemia    • BPH (benign prostatic hyperplasia)    • Dementia (CMS/HCC)    • Elevated cholesterol    • Fall    • Heart attack (CMS/HCC)    • Heart disease          Past Surgical History:   Procedure Laterality Date   • CARDIAC SURGERY      stent           Current Outpatient Medications:   •  aspirin 81 MG tablet, Take 81 mg by mouth., Disp: , Rfl:   •  carvedilol (COREG) 6.25 MG tablet, take 1  tablet by mouth twice a day with food, Disp: , Rfl:   •  divalproex (DEPAKOTE) 250 MG 24 hr tablet, 250 mg Daily., Disp: , Rfl:   •  donepezil (ARICEPT) 23 MG tablet, Take 1 tablet by mouth every night at bedtime., Disp: 90 tablet, Rfl: 3  •  hydrALAZINE (APRESOLINE) 50 MG tablet, take 1 tablet by mouth every 8 hours, Disp: , Rfl:   •  HYDROcodone-acetaminophen (NORCO)  MG per tablet, take 1 tablet by mouth every 8 hours if needed for pain, Disp: , Rfl:   •  isosorbide mononitrate (IMDUR) 30 MG 24 hr tablet, Take 30 mg by mouth., Disp: , Rfl:   •  Multiple Vitamins-Minerals (MULTIVITAMIN ADULT PO), Take  by mouth., Disp: , Rfl:   •  nitroglycerin (NITROSTAT) 0.4 MG SL tablet, Take 0.4 mg by mouth Every Night., Disp: , Rfl: 1  •  omeprazole (priLOSEC) 20 MG capsule, Take 20 mg by mouth., Disp: , Rfl:   •  QUEtiapine (SEROquel) 25 MG tablet, Take 12.5 mg by mouth Daily., Disp: , Rfl:   •  tamsulosin (FLOMAX) 0.4 MG capsule 24 hr capsule, TAKE 1 CAPSULE BY MOUTH NIGHTLY INSTEAD OF OXYBUTNIN, Disp: , Rfl: 3  •  azelastine (ASTELIN) 0.1 % nasal spray, USE 1 SPRAY(S) IN EACH NOSTRIL TWICE DAILY AS DIRECTED, Disp: , Rfl: 5  •  divalproex (DEPAKOTE) 250 MG DR tablet, Take 125 mg by mouth Every Night. Pt takes a half tablet at night 125mg, Disp: , Rfl:   •  ipratropium (ATROVENT) 0.06 % nasal spray, USE 2 SPRAY(S) IN EACH NOSTRIL TWICE DAILY, Disp: , Rfl: 2      Family History   Problem Relation Age of Onset   • Aneurysm Sister    • Heart attack Brother    • Cancer Brother          Social History     Socioeconomic History   • Marital status:      Spouse name: Not on file   • Number of children: Not on file   • Years of education: Not on file   • Highest education level: Not on file   Tobacco Use   • Smoking status: Current Every Day Smoker     Packs/day: 0.50     Types: Cigarettes   • Smokeless tobacco: Never Used   Substance and Sexual Activity   • Alcohol use: No   • Drug use: No         Allergies   Allergen  "Reactions   • Amlodipine Other (See Comments)     SOMNOLENCE, CONFUSION   • Celecoxib    • Clopidogrel Bisulfate Other (See Comments)     Interaction with flomax   • Lovastatin    • Simvastatin    • Sulfa Antibiotics Nausea Only   • Valsartan          Pain Scale: 2/10 generalized back pain         ROS:  Review of Systems   Constitutional: Negative for activity change, appetite change and unexpected weight change.   HENT: Negative for facial swelling, trouble swallowing and voice change.    Eyes: Negative for photophobia, pain and visual disturbance.   Respiratory: Negative for chest tightness, shortness of breath and wheezing.    Cardiovascular: Negative for chest pain, palpitations and leg swelling.   Gastrointestinal: Negative for abdominal pain, nausea and vomiting.   Endocrine: Negative for cold intolerance and heat intolerance.   Musculoskeletal: Negative for arthralgias, back pain, gait problem, joint swelling, myalgias, neck pain and neck stiffness.   Neurological: Negative for dizziness, tremors, seizures, syncope, facial asymmetry, speech difficulty, weakness, light-headedness, numbness and headaches.   Hematological: Bruises/bleeds easily.   Psychiatric/Behavioral: Positive for agitation. Negative for behavioral problems, confusion, decreased concentration, dysphoric mood, hallucinations, self-injury, sleep disturbance and suicidal ideas. The patient is not nervous/anxious and is not hyperactive.            Physical Exam:  Vitals:    03/05/20 1106   BP: 150/60   BP Location: Right arm   Patient Position: Sitting   Cuff Size: Adult   Pulse: 66   SpO2: 98%   Weight: 50.3 kg (111 lb)   Height: 162.6 cm (64\")     Body mass index is 19.05 kg/m².    Physical Exam    Gen.: Underweight white male (BMI 19.1)  no acute distress  HEENT: Normocephalic no evidence of trauma.   Neck: Supple.  Heart: Regular rate and rhythm.; HR 66         Neurological Exam:   Mental status: Awake alert, attentive/interative in " conversation. Jovial @ times and teary at others (unchanged from prior exams) Very upset about not   HCF: No aphasia and no obvious dyspraxia.  MMSE 23/30.  Clock drawing 2/4.  Animal naming 13 animals in 1 minute  Cranial nerves: 2-12 intact except left eye visual field cut  Motor: No focal weakness with completely normal tone.  No cogwheeling/rigidity noted.   Cerebellar: Normal (finger to nose and heel to shin)  Gait and Station: Antalgic due to knee pain            Results:      Lab Results   Component Value Date    BUN 26 (H) 01/03/2020    CREATININE 1.1 01/03/2020    BCR 23.6 01/03/2020    CO2 22 01/03/2020    CALCIUM 8.5 01/03/2020    ALBUMIN 3.0 (L) 01/03/2020    LABIL2 1.1 01/03/2020    AST 18 01/03/2020    ALT 9 01/03/2020       Lab Results   Component Value Date    WBC 7.06 01/03/2020    HGB 9.2 (L) 01/03/2020    HCT 29.2 (L) 01/03/2020    MCV 91.8 01/03/2020     01/03/2020         .  Lab Results   Component Value Date    RPR Non-Reactive 08/21/2017         Lab Results   Component Value Date    TSH 1.580 09/13/2019         Lab Results   Component Value Date    [id number] 853 09/13/2019         Lab Results   Component Value Date    FOLATE >20.00 08/21/2017         No results found for: HGBA1C            Assessment  1.  Probable Alzheimer's disease; MMSE declined since last visit 23/30 today.  2/4 clock draw 13 animals in 1 minute.             Plan:  · Continue Aricept as written  · Continue Depakote 250 mg daily  · Obtain urinalysis; consider prophylactic antibiotic in the future  · Follow-up with me in 3 months; sooner if needed      Heraclio was seen today for alzheimer's disease.    Diagnoses and all orders for this visit:    Late onset Alzheimer's disease with behavioral disturbance (CMS/HCC)    History of UTI  -     Cancel: Urinalysis With Culture If Indicated - Urine, Clean Catch; Future  -     Urinalysis With Culture If Indicated - Urine, Clean Catch; Future  -     Urinalysis With Culture If  Indicated - Urine, Clean Catch    Altered mental status, unspecified altered mental status type  -     Cancel: Urinalysis With Culture If Indicated - Urine, Clean Catch; Future  -     Urinalysis With Culture If Indicated - Urine, Clean Catch; Future  -     Urinalysis With Culture If Indicated - Urine, Clean Catch    Carotid artery disease, unspecified laterality, unspecified type (CMS/[facility])    Chronic obstructive pulmonary disease, unspecified COPD type (CMS/[facility])    At high risk for falls        MDM  Reviewed: previous chart, nursing note and vitals (Reviewed 1231 admission/work-up at [facility] Everywhere)  Reviewed previous: labs  Interpretation: labs                                Dictated utilizing Dragon dictation.

## 2020-03-06 ENCOUNTER — TELEPHONE (OUTPATIENT)
Dept: NEUROLOGY | Facility: CLINIC | Age: 85
End: 2020-03-06

## 2020-03-06 LAB
APPEARANCE UR: CLEAR
BACTERIA #/AREA URNS HPF: ABNORMAL /HPF
BILIRUB UR QL STRIP: NEGATIVE
CASTS URNS MICRO: ABNORMAL
CASTS URNS QL MICRO: PRESENT /LPF
COLOR UR: YELLOW
EPI CELLS #/AREA URNS HPF: ABNORMAL /HPF (ref 0–10)
GLUCOSE UR QL: NEGATIVE
HGB UR QL STRIP: NEGATIVE
KETONES UR QL STRIP: NEGATIVE
LEUKOCYTE ESTERASE UR QL STRIP: NEGATIVE
MICRO URNS: NORMAL
MICRO URNS: NORMAL
MUCOUS THREADS URNS QL MICRO: PRESENT /HPF
NITRITE UR QL STRIP: NEGATIVE
PH UR STRIP: 6.5 [PH] (ref 5–7.5)
PROT UR QL STRIP: NORMAL
RBC #/AREA URNS HPF: ABNORMAL /HPF (ref 0–2)
SP GR UR: 1.02 (ref 1–1.03)
URINALYSIS REFLEX: NORMAL
UROBILINOGEN UR STRIP-MCNC: 0.2 MG/DL (ref 0.2–1)
WBC #/AREA URNS HPF: ABNORMAL /HPF (ref 0–5)

## 2020-03-06 NOTE — TELEPHONE ENCOUNTER
Spoke with daughter. Gave her info and she verbalized understanding. Will call back after amanuel talks to pcp.

## 2020-03-06 NOTE — TELEPHONE ENCOUNTER
----- Message from GERI Durán sent at 3/6/2020 12:46 PM EST -----  UA unremarkable; joao Copeland noted. Will call PCP to get his approve to start prophylactic abx (Keflex 250mg daily) to hopefully prevent recurrent UTI. I will get back to patient/daghter in the near future.       ----- Message -----  From: Yoko Vee MA  Sent: 3/6/2020   9:46 AM EST  To: GERI Durán    Looks like the results are in the chart, microscopic abnormal.  Is there something else I need to call and check on or call the patient?    Thank you  ----- Message -----  From: Diana Moraes APRN  Sent: 3/6/2020   9:13 AM EST  To: Yoko Vee MA    Will you follow-up on UA collected yesterday (next door)?

## 2020-06-03 ENCOUNTER — TELEPHONE (OUTPATIENT)
Dept: NEUROLOGY | Facility: CLINIC | Age: 85
End: 2020-06-03

## 2020-06-03 NOTE — TELEPHONE ENCOUNTER
DIMA  [phone]    THEY ARE HAVING AN ISSUE  PT AND HIS WIFE HAVE SPLIT UP AND HE IS LIVING WITH DIMA  SHE IS TRYING TO PUT HIM IN A NURSING HOME.    THEY NEED SOMETHING FROM YOU THAT STATES HE IS MILD DEMENTIA NOT FULL DEMENTIA    WIFE IS HIS POA    PLEASE CALL ANYTIME,  THIS IS VERY IMPORTANT

## 2020-06-05 NOTE — TELEPHONE ENCOUNTER
DAUGHTERDIMA  [phone]    DAUGHTER CALLED AGAIN ASKING FOR RETURN CALL FROM ARELIS [place], STATES IT IS OF EXTREME IMPORTANCE.

## 2020-06-10 ENCOUNTER — TELEPHONE (OUTPATIENT)
Dept: NEUROLOGY | Facility: CLINIC | Age: 85
End: 2020-06-10

## 2020-06-10 DIAGNOSIS — F02.818 LATE ONSET ALZHEIMER'S DISEASE WITH BEHAVIORAL DISTURBANCE (HCC): Primary | ICD-10-CM

## 2020-06-10 DIAGNOSIS — G30.1 LATE ONSET ALZHEIMER'S DISEASE WITH BEHAVIORAL DISTURBANCE (HCC): Primary | ICD-10-CM

## 2020-06-10 NOTE — TELEPHONE ENCOUNTER
PT'S DAUGHTER DIMA CALLED BACK AND STATES SHE HAS NOT HEARD ANYTHING FROM GERI LIZ PER PREVIOUS ENCOUNTER. SHE SAID THERE IS A SENSE OF URGENCY TO GET A REFERRAL TO A PSYCHOLOGIST TO DR Muna SANTIAGO AND SAID SCARLETT HAD TOLD HER ARELIS WOULD CALL HER ON Monday AND SHE HASN'T HEARD ANYTHING. SHE SAID SHE DOES NOT WANT TO PUT THE PT IN A NURSING HOME AND SAID DR. SANTIAGO HAD AGREED TO COME EVALUATE HIM BUT SHE IS IN NEED OF A REFERRAL. PLEASE ADVISE      BEST CALL [phone]

## 2020-06-17 ENCOUNTER — TELEPHONE (OUTPATIENT)
Dept: NEUROLOGY | Facility: CLINIC | Age: 85
End: 2020-06-17

## 2020-06-17 NOTE — TELEPHONE ENCOUNTER
Provider:GERI LIZ  Caller: DORITA GARCIA  Relationship to Patient: PT'S DAUGHTER  Phone Number: [phone]      Reason for Call: PT'S DAUGHTER WANTS THE REPORT THAT GERI LIZ AND DR. SUN WEIR IS TO SEND TO HER, PLEASE SEND IT TO THE ADDRESS    [address] [place], [place] [address] [id number]

## 2020-06-19 NOTE — TELEPHONE ENCOUNTER
PT'S DAUGHTER CALLED AND WAS CHECKING ON THE REPORT  SHE STATED THEY WERE ON A TIME LINE  AND WAS JUST SEEING SHE SAID A GOOD CALL  NUMBER [phone]

## 2020-08-18 ENCOUNTER — OFFICE VISIT (OUTPATIENT)
Dept: NEUROLOGY | Facility: CLINIC | Age: 85
End: 2020-08-18

## 2020-08-18 DIAGNOSIS — F32.2 AGITATED DEPRESSION (HCC): Primary | ICD-10-CM

## 2020-08-18 DIAGNOSIS — G30.1 LATE ONSET ALZHEIMER'S DISEASE WITH BEHAVIORAL DISTURBANCE (HCC): ICD-10-CM

## 2020-08-18 DIAGNOSIS — F02.818 LATE ONSET ALZHEIMER'S DISEASE WITH BEHAVIORAL DISTURBANCE (HCC): ICD-10-CM

## 2020-08-18 DIAGNOSIS — G25.81 RESTLESS LEGS SYNDROME (RLS): ICD-10-CM

## 2020-08-18 PROBLEM — R09.82 PND (POST-NASAL DRIP): Status: ACTIVE | Noted: 2020-04-21

## 2020-08-18 PROBLEM — F51.04 PSYCHOPHYSIOLOGICAL INSOMNIA: Status: ACTIVE | Noted: 2020-01-21

## 2020-08-18 PROBLEM — I50.30 HEART FAILURE WITH PRESERVED EJECTION FRACTION (HCC): Status: ACTIVE | Noted: 2017-06-22

## 2020-08-18 PROBLEM — Z72.0 TOBACCO USER: Status: ACTIVE | Noted: 2020-07-16

## 2020-08-18 PROBLEM — Z78.9 STATIN INTOLERANCE: Status: ACTIVE | Noted: 2020-03-09

## 2020-08-18 PROCEDURE — 99443 PR PHYS/QHP TELEPHONE EVALUATION 21-30 MIN: CPT | Performed by: PSYCHIATRY & NEUROLOGY

## 2020-08-18 RX ORDER — DONEPEZIL HYDROCHLORIDE 23 MG/1
23 TABLET, FILM COATED ORAL
Qty: 90 TABLET | Refills: 3 | Status: SHIPPED | OUTPATIENT
Start: 2020-08-18

## 2020-08-18 RX ORDER — DIVALPROEX SODIUM 250 MG/1
250 TABLET, EXTENDED RELEASE ORAL DAILY
Qty: 90 TABLET | Refills: 1 | Status: SHIPPED | OUTPATIENT
Start: 2020-08-18

## 2020-08-18 NOTE — PROGRESS NOTES
CC: Alzheimer's disease; agitated depression    HPI:  Heraclio Rebolledo is a  85 y.o. right-handed white male who I am seeing as a scheduled telephone follow-up.  The patient and his daughter were at home and I am in the office.  The patient was seen last in our office by Diana Moraes NP 3/5/2020.  The patient is on donepezil 23 mg nightly plus Depakote  mg.  He is tolerating both drugs.  He and daughter both state that the Depakote seems to have helped his agitation.  He feels rather depressed a lot of the time according to his daughter and she asks about antidepressant treatment.  I had felt previously when I saw him that he had agitated depression but apparently this was not addressed by his primary care doctor.  He has since changed primary care to [facility] most recently seen by Priscila Loja.  He has another appointment with her soon.    He states that he occasionally gets bad cramping in his leg.  This is not frequent.  He had a problem with a toe being very painful and some swelling of the foot and was treated briefly with antibiotics and then prednisone.  The prednisone seemed to help quite a bit.  A uric acid level was obtained at 6.8.    He indicates that restless legs are not a major symptom at this time.        Past Medical History:   Diagnosis Date   • Anemia    • BPH (benign prostatic hyperplasia)    • Dementia (CMS/HCC)    • Elevated cholesterol    • Fall    • Heart attack (CMS/HCC)    • Heart disease          Past Surgical History:   Procedure Laterality Date   • CARDIAC SURGERY      stent           Current Outpatient Medications:   •  aspirin 81 MG tablet, Take 81 mg by mouth., Disp: , Rfl:   •  carvedilol (COREG) 6.25 MG tablet, take 1 tablet by mouth twice a day with food, Disp: , Rfl:   •  divalproex (DEPAKOTE) 250 MG 24 hr tablet, Take 1 tablet by mouth Daily., Disp: 90 tablet, Rfl: 1  •  donepezil (ARICEPT) 23 MG tablet, Take 1 tablet by mouth every night at bedtime., Disp: 90  tablet, Rfl: 3  •  hydrALAZINE (APRESOLINE) 50 MG tablet, take 1 tablet by mouth every 8 hours, Disp: , Rfl:   •  isosorbide mononitrate (IMDUR) 30 MG 24 hr tablet, Take 30 mg by mouth., Disp: , Rfl:   •  nitroglycerin (NITROSTAT) 0.4 MG SL tablet, Take 0.4 mg by mouth Every Night., Disp: , Rfl: 1  •  omeprazole (priLOSEC) 20 MG capsule, Take 20 mg by mouth., Disp: , Rfl:   •  Pediatric Multivitamins-Iron (CHILDRENS CHEWABLE VITAMINS/FE PO), Take  by mouth., Disp: , Rfl:   •  QUEtiapine (SEROquel) 25 MG tablet, Take 12.5 mg by mouth Daily., Disp: , Rfl:   •  tamsulosin (FLOMAX) 0.4 MG capsule 24 hr capsule, TAKE 1 CAPSULE BY MOUTH NIGHTLY INSTEAD OF OXYBUTNIN, Disp: , Rfl: 3  •  azelastine (ASTELIN) 0.1 % nasal spray, USE 1 SPRAY(S) IN EACH NOSTRIL TWICE DAILY AS DIRECTED, Disp: , Rfl: 5      Family History   Problem Relation Age of Onset   • Aneurysm Sister    • Heart attack Brother    • Cancer Brother          Social History     Socioeconomic History   • Marital status:      Spouse name: Not on file   • Number of children: Not on file   • Years of education: Not on file   • Highest education level: Not on file   Tobacco Use   • Smoking status: Current Every Day Smoker     Packs/day: 0.50     Types: Cigarettes   • Smokeless tobacco: Never Used   Substance and Sexual Activity   • Alcohol use: No   • Drug use: No         Allergies   Allergen Reactions   • Amlodipine Other (See Comments)     SOMNOLENCE, CONFUSION   • Celecoxib    • Clopidogrel Bisulfate Other (See Comments)     Interaction with flomax   • Lovastatin    • Simvastatin    • Sulfa Antibiotics Nausea Only   • Valsartan          Pain Scale:        ROS:  Review of Systems   Constitutional: Negative for activity change, appetite change and unexpected weight change.   Eyes: Negative for pain, redness and visual disturbance.   Neurological: Positive for numbness (right leg numbness/burning). Negative for dizziness, tremors, seizures, syncope, facial  asymmetry, speech difficulty, weakness, light-headedness and headaches.   Psychiatric/Behavioral: Negative for agitation, behavioral problems, confusion, decreased concentration, dysphoric mood, hallucinations and sleep disturbance. The patient is nervous/anxious (due to family matters). The patient is not hyperactive.          I have reviewed and agree with the above ROS completed by the medical assistant.      Physical Exam:  There were no vitals filed for this visit.  Orthostatic BP:    There is no height or weight on file to calculate BMI.    Physical Exam    No physical exam obtained since this is a telephone follow-up    Neurological Exam:       Results:      Lab Results   Component Value Date    BUN 37 (H) 07/16/2020    CREATININE 1.8 (H) 07/16/2020    BCR 20.6 07/16/2020    CO2 23 07/16/2020    CALCIUM 9.0 07/16/2020    ALBUMIN 3.9 07/16/2020    LABIL2 1.3 07/16/2020    AST 31 07/16/2020    ALT 9 07/16/2020       Lab Results   Component Value Date    WBC 7.42 07/16/2020    HGB 9.9 (L) 07/16/2020    HCT 33.5 (L) 07/16/2020    MCV 94.6 07/16/2020     07/16/2020         .  Lab Results   Component Value Date    RPR Non-Reactive 08/21/2017         Lab Results   Component Value Date    TSH 2.790 07/16/2020         Lab Results   Component Value Date    [id number] 630 07/16/2020         Lab Results   Component Value Date    FOLATE >20.0 07/16/2020         No results found for: HGBA1C      Lab Results   Component Value Date    BUN 37 (H) 07/16/2020    CREATININE 1.8 (H) 07/16/2020    BCR 20.6 07/16/2020    K 4.5 07/16/2020    CO2 23 07/16/2020    CALCIUM 9.0 07/16/2020    ALBUMIN 3.9 07/16/2020    LABIL2 1.3 07/16/2020    AST 31 07/16/2020    ALT 9 07/16/2020         Lab Results   Component Value Date    WBC 7.42 07/16/2020    HGB 9.9 (L) 07/16/2020    HCT 33.5 (L) 07/16/2020    MCV 94.6 07/16/2020     07/16/2020             Assessment:   1.  Alzheimer's disease-continue Aricept 23 mg daily and Depakote   mg daily for agitation/impulsive behavior  2.  Depressed symptoms-I advised him to contact his primary physician for further discussion.  The patient indicates that his wife of more than 60 years had bipolar disorder and abruptly left taking all of their money (about $60,000).  He states this causes him depressed feelings  3.  Restless legs by history-not particularly symptomatic currently        Plan:  1.  Continue Aricept 23 mg nightly  2.  Continue Depakote  daily.  3.  Follow-up with Diana Moraes NP in 6 months.      >50% of this 25-minute follow-up was spent counseling the patient and his daughter regarding the treatment of his Alzheimer's disease and the recommendation of furthering his investigation/treatment into agitated depression.                          Dictated utilizing Dragon dictation.

## 2024-01-25 NOTE — PROGRESS NOTES
CC: Alzheimer's disease, Parkinson's disease     HPI:  Heraclio Rebolledo is a  84 y.o.  right-handed male who I am seen today in follow-up for the above.  He is accompanied by his daughter who assist in providing some portion of his history.      Mr. Rebolledo was last seen in follow-up by me on 2019 for the same.  At that time his MMSE improved from 24/30 to 27/30 since Aricept 23 mg nightly initiated.  Today, his MMSE is again 27/30.  Clock draw mildly abnormal 3/.  Animal namin/1-minute.  Since last seen me he reports he lost his dog of 12 years and his wife has recently been ill which has been very distressing to him.  He continues to report more good days than bad although he says he is still very short fused/easily frustrated.  His daughter states that he is using Depakote for mood stabilization.  He was previously on 250 mg nightly which made him quite dizzy therefore he had to back up to 125 mg nightly which is offered moderate relief according to his daughter.  He is very calm and appropriate throughout my exam; jovial at times.  He reports 1 noninjury fall since last seen me.  He continues to have poor appetite although he says he recently got a cortisone shot which has stimulated his appetite.  Reports that he is very depressed since losing his dog and became very tearful during my exam.  He denies any concern for SI/HI.  He continues to smoke 5 to 10 cigarettes/day.     Overall, patient and daughter are pleased with the results of the Aricept 23 mg nightly they would like to continue current dosing.  I agree with this plan and will see them back in 3 months; sooner if needed.        Past Medical History:   Diagnosis Date   • Anemia    • Dementia          History reviewed. No pertinent surgical history.        Current Outpatient Medications:   •  aspirin 81 MG tablet, Take 81 mg by mouth., Disp: , Rfl:   •  carvedilol (COREG) 6.25 MG tablet, take 1 tablet by mouth twice a day with food, Disp:  Subjective   Patient ID: Thalia Mock back to put pressure on the bridge of her nose the clots make it trouble to breath. ).  Subjective  Thalia Mock is a 25 y.o. female who I am asked to see for a nosebleed from right nostril.  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    Nosebleeds persist once every couple of weeks  Dehumidifier use, chapstick up the nose, have been attempted.    No injury, no trauma to the nose.    Objective  External trauma is not present.  Upon presentation, active bleeding is not present from right nostril. Kiesselbach's plexus is scabbed on the right.  She does have nose piercing in place on right nose as well.    Assessment/Plan  Anterior Epistaxis, controlled. Silver Nitrate applied to internal nare scab.  1. Recommend no nose blowing for 24 hours.  2. Return if recurs and unable to stop bleeding.    Subjective  Thalia Mock is a 25 y.o. female who presents for evaluation of fatigue. Symptoms began several years ago. The patient feels the fatigue began with: exercise intolerance. Symptoms of her fatigue have been anxiousness, change in appetite, diffuse soft tissue aches and pains, general malaise, hypersomnolence, and lack of interest in usual activities. Patient describes the following psychological symptoms: depression. Patient denies change in hair texture, cold intolerance, and constipation. Symptoms have progressed to a point and plateaued. Symptom severity: struggles to carry out day to day responsibilities.. Previous visits for this problem: multiple, this is a longstanding diagnosis. Last seen a few weeks ago by me.     Objective  [unfilled]    Assessment/Plan  Fatigue, organic etiology unlikely based on careful history and exam..  Discussed  , Rfl:   •  divalproex (DEPAKOTE) 250 MG 24 hr tablet, 0.5 tablets Daily., Disp: , Rfl:   •  donepezil (ARICEPT) 23 MG tablet, TAKE 1 TABLET BY MOUTH EVERY DAY AT BEDTIME, Disp: 30 tablet, Rfl: 3  •  hydrALAZINE (APRESOLINE) 50 MG tablet, take 1 tablet by mouth every 8 hours, Disp: , Rfl:   •  HYDROcodone-acetaminophen (NORCO)  MG per tablet, take 1 tablet by mouth every 8 hours if needed for pain, Disp: , Rfl:   •  isosorbide mononitrate (IMDUR) 30 MG 24 hr tablet, Take 30 mg by mouth., Disp: , Rfl:   •  meclizine (ANTIVERT) 12.5 MG tablet, Take 12.5 mg by mouth. As needed, Disp: , Rfl:   •  Multiple Vitamins-Minerals (MULTIVITAMIN ADULT PO), Take  by mouth., Disp: , Rfl:   •  nitroglycerin (NITROSTAT) 0.4 MG SL tablet, Take 0.4 mg by mouth Every Night., Disp: , Rfl: 1  •  omeprazole (priLOSEC) 20 MG capsule, Take 20 mg by mouth., Disp: , Rfl:   •  tamsulosin (FLOMAX) 0.4 MG capsule 24 hr capsule, TAKE 1 CAPSULE BY MOUTH NIGHTLY INSTEAD OF OXYBUTNIN, Disp: , Rfl: 3  •  azelastine (ASTELIN) 0.1 % nasal spray, USE 1 SPRAY(S) IN EACH NOSTRIL TWICE DAILY AS DIRECTED, Disp: , Rfl: 5  •  divalproex (DEPAKOTE) 250 MG DR tablet, Take 125 mg by mouth Every Night. Pt takes a half tablet at night 125mg, Disp: , Rfl:   •  donepezil (ARICEPT) 23 MG tablet, Take 1 tablet by mouth every night at bedtime., Disp: 30 tablet, Rfl: 3  •  ipratropium (ATROVENT) 0.06 % nasal spray, USE 2 SPRAY(S) IN EACH NOSTRIL TWICE DAILY, Disp: , Rfl: 2  •  oxybutynin (DITROPAN) 5 MG tablet, Take 5 mg by mouth 2 (Two) Times a Day., Disp: , Rfl: 0      Family History   Problem Relation Age of Onset   • Aneurysm Sister    • Heart attack Brother    • Cancer Brother          Social History     Socioeconomic History   • Marital status:      Spouse name: Not on file   • Number of children: Not on file   • Years of education: Not on file   • Highest education level: Not on file   Tobacco Use   • Smoking status: Current Every Day Smoker      Pulse: (!) 115   Temp: 35.9 °C (96.7 °F)   SpO2: 95%       Review of Systems   HENT:  Positive for nosebleeds.    All other systems reviewed and are negative.      Objective   Physical Exam  Vitals and nursing note reviewed.   Constitutional:       General: She is not in acute distress.     Appearance: Normal appearance. She is not ill-appearing, toxic-appearing or diaphoretic.   HENT:      Nose:      Left Nostril: No foreign body or epistaxis.      Comments: Right nostril with scabbing   Neurological:      Mental Status: She is alert.         Assessment/Plan   Problem List Items Addressed This Visit    None  Visit Diagnoses       Anterior epistaxis    -  Primary    Relevant Medications    mupirocin (Bactroban) 2 % cream    Other Relevant Orders    Referral to ENT                 Thank you for coming in today, please call my office if you have any concerns or questions.     Dirk MCQUEEN, CNP   "Packs/day: 0.50     Types: Cigarettes   • Smokeless tobacco: Never Used   Substance and Sexual Activity   • Alcohol use: No   • Drug use: No         Allergies   Allergen Reactions   • Celecoxib    • Clopidogrel Bisulfate Other (See Comments)     Interaction with flomax   • Lovastatin    • Simvastatin    • Sulfa Antibiotics Nausea Only   • Valsartan          Pain Scale:0/10        ROS:  Review of Systems   Constitutional: Negative for activity change, appetite change and unexpected weight change.   HENT: Positive for trouble swallowing. Negative for facial swelling and voice change.    Eyes: Positive for photophobia and visual disturbance. Negative for pain.   Respiratory: Positive for wheezing. Negative for chest tightness and shortness of breath.    Cardiovascular: Negative for chest pain, palpitations and leg swelling.   Gastrointestinal: Negative for abdominal pain, nausea and vomiting.   Endocrine: Positive for cold intolerance. Negative for heat intolerance.   Musculoskeletal: Positive for back pain and gait problem. Negative for arthralgias, joint swelling, myalgias, neck pain and neck stiffness.   Neurological: Positive for dizziness and light-headedness. Negative for tremors, seizures, syncope, facial asymmetry, speech difficulty, weakness, numbness and headaches.   Hematological: Bruises/bleeds easily.   Psychiatric/Behavioral: Positive for agitation, confusion and decreased concentration. Negative for behavioral problems, dysphoric mood, hallucinations, self-injury, sleep disturbance and suicidal ideas. The patient is nervous/anxious. The patient is not hyperactive.            Physical Exam:  Vitals:    08/26/19 1407   BP: 170/62   BP Location: Right arm   Patient Position: Sitting   Cuff Size: Adult   Pulse: 67   SpO2: (!) 88%   Weight: 49.4 kg (109 lb)   Height: 162.6 cm (64\")     Body mass index is 18.71 kg/m².    Physical Exam       Gen.: Underweight white male no acute distress  HEENT: Normocephalic " no evidence of trauma.   Neck: Supple.  Heart: Regular rate and rhythm.        Neurological Exam:   Mental status: Awake alert, attentive/interative in conversation. Jovial @ times and teary at others; more than previous visits  HCF: No aphasia and no obvious dyspraxia.  MMSE 27/30; unchanged from previous  Cranial nerves: 2-12 intact  Motor: No focal weakness with completely normal tone.  Cerebellar: Normal (finger to nose and heel to shin)  Gait and Station: Antalgic due to knee pain                 Results:      Lab Results   Component Value Date    BUN 38 (H) 08/01/2018    CREATININE 1.5 08/01/2018    BCR 25.3 08/01/2018    CO2 24 08/01/2018    CALCIUM 9.5 08/01/2018    ALBUMIN 3.7 08/01/2018    LABIL2 1.3 08/01/2018    AST 15 08/01/2018    ALT 19 08/01/2018       Lab Results   Component Value Date    WBC 7.68 08/01/2018    HGB 10.5 (L) 08/01/2018    HCT 32.7 (L) 08/01/2018    MCV 91.9 08/01/2018     08/01/2018         .  Lab Results   Component Value Date    RPR Non-Reactive 08/21/2017         Lab Results   Component Value Date    TSH 3.930 08/21/2017         Lab Results   Component Value Date    [id number] 813 08/21/2017         Lab Results   Component Value Date    FOLATE >20.00 08/21/2017         No results found for: HGBA1C            Assessment:   1.  Probable Alzheimer's disease.              Plan:  Continue Aricept 23 mg nightly  Follow-up with me in 3 months; sooner if indicated        Heraclio was seen today for alzheimer's disease.    Diagnoses and all orders for this visit:    Late onset Alzheimer's disease with behavioral disturbance        Coding                            Dictated utilizing Dragon dictation.